# Patient Record
Sex: MALE | Race: BLACK OR AFRICAN AMERICAN | Employment: UNEMPLOYED | URBAN - METROPOLITAN AREA
[De-identification: names, ages, dates, MRNs, and addresses within clinical notes are randomized per-mention and may not be internally consistent; named-entity substitution may affect disease eponyms.]

---

## 2020-01-15 ENCOUNTER — ED HISTORICAL/CONVERTED ENCOUNTER (OUTPATIENT)
Dept: OTHER | Age: 25
End: 2020-01-15

## 2020-02-04 ENCOUNTER — ED HISTORICAL/CONVERTED ENCOUNTER (OUTPATIENT)
Dept: OTHER | Age: 25
End: 2020-02-04

## 2020-03-08 ENCOUNTER — ED HISTORICAL/CONVERTED ENCOUNTER (OUTPATIENT)
Dept: OTHER | Age: 25
End: 2020-03-08

## 2021-03-16 ENCOUNTER — HOSPITAL ENCOUNTER (EMERGENCY)
Age: 26
Discharge: HOME | End: 2021-03-16
Attending: EMERGENCY MEDICINE
Payer: MEDICAID

## 2021-03-16 VITALS
HEART RATE: 57 BPM | DIASTOLIC BLOOD PRESSURE: 73 MMHG | SYSTOLIC BLOOD PRESSURE: 121 MMHG | RESPIRATION RATE: 18 BRPM | HEIGHT: 74 IN | BODY MASS INDEX: 22.46 KG/M2 | OXYGEN SATURATION: 98 % | WEIGHT: 175 LBS | TEMPERATURE: 98.6 F

## 2021-03-16 DIAGNOSIS — N34.2 URETHRITIS: Primary | ICD-10-CM

## 2021-03-16 LAB
APPEARANCE UR: CLEAR
BACTERIA URNS QL MICRO: NEGATIVE /HPF
BILIRUB UR QL: NEGATIVE
COLOR UR: YELLOW
EPITH CASTS URNS QL MICRO: NORMAL /LPF
GLUCOSE UR STRIP.AUTO-MCNC: NEGATIVE MG/DL
HGB UR QL STRIP: ABNORMAL
KETONES UR QL STRIP.AUTO: NEGATIVE MG/DL
LEUKOCYTE ESTERASE UR QL STRIP.AUTO: NEGATIVE
NITRITE UR QL STRIP.AUTO: NEGATIVE
PH UR STRIP: 5 [PH] (ref 5–8)
PROT UR STRIP-MCNC: NEGATIVE MG/DL
RBC #/AREA URNS HPF: NORMAL /HPF (ref 0–5)
SP GR UR REFRACTOMETRY: 1.02 (ref 1–1.03)
UROBILINOGEN UR QL STRIP.AUTO: 0.1 EU/DL (ref 0.2–1)
WBC URNS QL MICRO: NORMAL /HPF (ref 0–4)

## 2021-03-16 PROCEDURE — 74011250637 HC RX REV CODE- 250/637: Performed by: EMERGENCY MEDICINE

## 2021-03-16 PROCEDURE — 81001 URINALYSIS AUTO W/SCOPE: CPT

## 2021-03-16 PROCEDURE — 87491 CHLMYD TRACH DNA AMP PROBE: CPT

## 2021-03-16 PROCEDURE — 99283 EMERGENCY DEPT VISIT LOW MDM: CPT

## 2021-03-16 RX ORDER — DOXYCYCLINE 100 MG/1
100 CAPSULE ORAL 2 TIMES DAILY
Qty: 14 CAP | Refills: 0 | Status: SHIPPED | OUTPATIENT
Start: 2021-03-16

## 2021-03-16 RX ORDER — METRONIDAZOLE 500 MG/1
2000 TABLET ORAL
Status: COMPLETED | OUTPATIENT
Start: 2021-03-16 | End: 2021-03-16

## 2021-03-16 RX ORDER — DOXYCYCLINE 100 MG/1
100 CAPSULE ORAL ONCE
Status: COMPLETED | OUTPATIENT
Start: 2021-03-16 | End: 2021-03-16

## 2021-03-16 RX ADMIN — METRONIDAZOLE 2000 MG: 500 TABLET ORAL at 19:42

## 2021-03-16 RX ADMIN — DOXYCYCLINE HYCLATE 100 MG: 100 CAPSULE ORAL at 19:42
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
## 2021-03-17 LAB
C TRACH DNA SPEC QL NAA+PROBE: NEGATIVE
N GONORRHOEA DNA SPEC QL NAA+PROBE: NEGATIVE
SAMPLE TYPE: NORMAL
SERVICE CMNT-IMP: NORMAL
SPECIMEN SOURCE: NORMAL

## 2023-07-20 ENCOUNTER — APPOINTMENT (OUTPATIENT)
Facility: HOSPITAL | Age: 28
End: 2023-07-20
Payer: MEDICAID

## 2023-07-20 ENCOUNTER — HOSPITAL ENCOUNTER (EMERGENCY)
Facility: HOSPITAL | Age: 28
Discharge: HOME | End: 2023-07-20
Attending: STUDENT IN AN ORGANIZED HEALTH CARE EDUCATION/TRAINING PROGRAM
Payer: MEDICAID

## 2023-07-20 VITALS
BODY MASS INDEX: 23.19 KG/M2 | HEART RATE: 104 BPM | HEIGHT: 73 IN | OXYGEN SATURATION: 95 % | WEIGHT: 175 LBS | RESPIRATION RATE: 18 BRPM | DIASTOLIC BLOOD PRESSURE: 76 MMHG | SYSTOLIC BLOOD PRESSURE: 118 MMHG | TEMPERATURE: 98.5 F

## 2023-07-20 DIAGNOSIS — S20.219A CONTUSION OF CHEST WALL, UNSPECIFIED LATERALITY, INITIAL ENCOUNTER: Primary | ICD-10-CM

## 2023-07-20 DIAGNOSIS — T14.8XXA ABRASION: ICD-10-CM

## 2023-07-20 LAB
ALBUMIN SERPL-MCNC: 4 G/DL (ref 3.5–5)
ALBUMIN/GLOB SERPL: 1.1 (ref 1.1–2.2)
ALP SERPL-CCNC: 106 U/L (ref 45–117)
ALT SERPL-CCNC: 17 U/L (ref 12–78)
ANION GAP SERPL CALC-SCNC: 9 MMOL/L (ref 5–15)
AST SERPL W P-5'-P-CCNC: 20 U/L (ref 15–37)
BASOPHILS # BLD: 0 K/UL (ref 0–0.1)
BASOPHILS NFR BLD: 0 % (ref 0–1)
BILIRUB SERPL-MCNC: 1.1 MG/DL (ref 0.2–1)
BUN SERPL-MCNC: 9 MG/DL (ref 6–20)
BUN/CREAT SERPL: 6 (ref 12–20)
CA-I BLD-MCNC: 9.7 MG/DL (ref 8.5–10.1)
CHLORIDE SERPL-SCNC: 103 MMOL/L (ref 97–108)
CO2 SERPL-SCNC: 24 MMOL/L (ref 21–32)
CREAT SERPL-MCNC: 1.5 MG/DL (ref 0.7–1.3)
DIFFERENTIAL METHOD BLD: ABNORMAL
EOSINOPHIL # BLD: 0 K/UL (ref 0–0.4)
EOSINOPHIL NFR BLD: 0 % (ref 0–7)
ERYTHROCYTE [DISTWIDTH] IN BLOOD BY AUTOMATED COUNT: 15 % (ref 11.5–14.5)
GLOBULIN SER CALC-MCNC: 3.5 G/DL (ref 2–4)
GLUCOSE SERPL-MCNC: 112 MG/DL (ref 65–100)
HCT VFR BLD AUTO: 51.5 % (ref 36.6–50.3)
HGB BLD-MCNC: 17.1 G/DL (ref 12.1–17)
IMM GRANULOCYTES # BLD AUTO: 0.1 K/UL (ref 0–0.04)
IMM GRANULOCYTES NFR BLD AUTO: 1 % (ref 0–0.5)
LYMPHOCYTES # BLD: 1.7 K/UL (ref 0.8–3.5)
LYMPHOCYTES NFR BLD: 24 % (ref 12–49)
MCH RBC QN AUTO: 32.2 PG (ref 26–34)
MCHC RBC AUTO-ENTMCNC: 33.2 G/DL (ref 30–36.5)
MCV RBC AUTO: 97 FL (ref 80–99)
MONOCYTES # BLD: 0.9 K/UL (ref 0–1)
MONOCYTES NFR BLD: 12 % (ref 5–13)
NEUTS SEG # BLD: 4.6 K/UL (ref 1.8–8)
NEUTS SEG NFR BLD: 63 % (ref 32–75)
NRBC # BLD: 0 K/UL (ref 0–0.01)
NRBC BLD-RTO: 0 PER 100 WBC
PLATELET # BLD AUTO: 221 K/UL (ref 150–400)
PMV BLD AUTO: 8.6 FL (ref 8.9–12.9)
POTASSIUM SERPL-SCNC: 3.6 MMOL/L (ref 3.5–5.1)
PROT SERPL-MCNC: 7.5 G/DL (ref 6.4–8.2)
RBC # BLD AUTO: 5.31 M/UL (ref 4.1–5.7)
SODIUM SERPL-SCNC: 136 MMOL/L (ref 136–145)
TROPONIN I SERPL HS-MCNC: 12 NG/L (ref 0–76)
WBC # BLD AUTO: 7.3 K/UL (ref 4.1–11.1)

## 2023-07-20 PROCEDURE — 85025 COMPLETE CBC W/AUTO DIFF WBC: CPT

## 2023-07-20 PROCEDURE — 99285 EMERGENCY DEPT VISIT HI MDM: CPT

## 2023-07-20 PROCEDURE — 90471 IMMUNIZATION ADMIN: CPT | Performed by: STUDENT IN AN ORGANIZED HEALTH CARE EDUCATION/TRAINING PROGRAM

## 2023-07-20 PROCEDURE — 6370000000 HC RX 637 (ALT 250 FOR IP): Performed by: STUDENT IN AN ORGANIZED HEALTH CARE EDUCATION/TRAINING PROGRAM

## 2023-07-20 PROCEDURE — 6360000002 HC RX W HCPCS: Performed by: STUDENT IN AN ORGANIZED HEALTH CARE EDUCATION/TRAINING PROGRAM

## 2023-07-20 PROCEDURE — 71045 X-RAY EXAM CHEST 1 VIEW: CPT

## 2023-07-20 PROCEDURE — 90714 TD VACC NO PRESV 7 YRS+ IM: CPT | Performed by: STUDENT IN AN ORGANIZED HEALTH CARE EDUCATION/TRAINING PROGRAM

## 2023-07-20 PROCEDURE — 80053 COMPREHEN METABOLIC PANEL: CPT

## 2023-07-20 PROCEDURE — 93005 ELECTROCARDIOGRAM TRACING: CPT | Performed by: STUDENT IN AN ORGANIZED HEALTH CARE EDUCATION/TRAINING PROGRAM

## 2023-07-20 PROCEDURE — 84484 ASSAY OF TROPONIN QUANT: CPT

## 2023-07-20 PROCEDURE — 36415 COLL VENOUS BLD VENIPUNCTURE: CPT

## 2023-07-20 RX ORDER — ACETAMINOPHEN 500 MG
1000 TABLET ORAL
Status: COMPLETED | OUTPATIENT
Start: 2023-07-20 | End: 2023-07-20

## 2023-07-20 RX ADMIN — ACETAMINOPHEN 1000 MG: 500 TABLET ORAL at 18:58

## 2023-07-20 RX ADMIN — CLOSTRIDIUM TETANI TOXOID ANTIGEN (FORMALDEHYDE INACTIVATED) AND CORYNEBACTERIUM DIPHTHERIAE TOXOID ANTIGEN (FORMALDEHYDE INACTIVATED) 0.5 ML: 5; 2 INJECTION, SUSPENSION INTRAMUSCULAR at 19:12

## 2023-07-20 ASSESSMENT — PAIN SCALES - GENERAL
PAINLEVEL_OUTOF10: 6
PAINLEVEL_OUTOF10: 8

## 2023-07-20 ASSESSMENT — LIFESTYLE VARIABLES
HOW MANY STANDARD DRINKS CONTAINING ALCOHOL DO YOU HAVE ON A TYPICAL DAY: PATIENT DOES NOT DRINK
HOW OFTEN DO YOU HAVE A DRINK CONTAINING ALCOHOL: NEVER

## 2023-07-20 ASSESSMENT — PAIN - FUNCTIONAL ASSESSMENT: PAIN_FUNCTIONAL_ASSESSMENT: 0-10
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
## 2023-07-21 LAB
EKG ATRIAL RATE: 99 BPM
EKG DIAGNOSIS: NORMAL
EKG P AXIS: 79 DEGREES
EKG P-R INTERVAL: 146 MS
EKG Q-T INTERVAL: 352 MS
EKG QRS DURATION: 92 MS
EKG QTC CALCULATION (BAZETT): 451 MS
EKG R AXIS: 75 DEGREES
EKG T AXIS: 63 DEGREES
EKG VENTRICULAR RATE: 99 BPM

## 2023-08-09 ENCOUNTER — HOSPITAL ENCOUNTER (EMERGENCY)
Facility: HOSPITAL | Age: 28
Discharge: HOME | End: 2023-08-09
Payer: MEDICAID

## 2023-08-09 ENCOUNTER — APPOINTMENT (OUTPATIENT)
Facility: HOSPITAL | Age: 28
End: 2023-08-09
Payer: MEDICAID

## 2023-08-09 VITALS
WEIGHT: 175 LBS | HEART RATE: 66 BPM | OXYGEN SATURATION: 100 % | BODY MASS INDEX: 23.19 KG/M2 | RESPIRATION RATE: 18 BRPM | HEIGHT: 73 IN | TEMPERATURE: 98.6 F | SYSTOLIC BLOOD PRESSURE: 141 MMHG | DIASTOLIC BLOOD PRESSURE: 91 MMHG

## 2023-08-09 DIAGNOSIS — S62.602D CLOSED NONDISPLACED FRACTURE OF PHALANX OF RIGHT MIDDLE FINGER WITH ROUTINE HEALING, UNSPECIFIED PHALANX, SUBSEQUENT ENCOUNTER: Primary | ICD-10-CM

## 2023-08-09 PROCEDURE — 99283 EMERGENCY DEPT VISIT LOW MDM: CPT

## 2023-08-09 PROCEDURE — 73130 X-RAY EXAM OF HAND: CPT

## 2023-08-09 PROCEDURE — 6370000000 HC RX 637 (ALT 250 FOR IP)

## 2023-08-09 RX ORDER — IBUPROFEN 600 MG/1
600 TABLET ORAL EVERY 6 HOURS PRN
Status: DISCONTINUED | OUTPATIENT
Start: 2023-08-09 | End: 2023-08-09 | Stop reason: HOSPADM

## 2023-08-09 RX ADMIN — IBUPROFEN 600 MG: 600 TABLET, FILM COATED ORAL at 12:09

## 2023-08-09 ASSESSMENT — PAIN - FUNCTIONAL ASSESSMENT: PAIN_FUNCTIONAL_ASSESSMENT: 0-10

## 2023-08-09 ASSESSMENT — PAIN SCALES - GENERAL: PAINLEVEL_OUTOF10: 7
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
## 2023-08-09 NOTE — DISCHARGE INSTRUCTIONS
Thank you! Thank you for allowing me to care for you in the emergency department. It is my goal to provide you with excellent care. If you have not received excellent quality care, please ask to speak to the nurse manager. Please fill out the survey that will come to you by mail or email since we listen to your feedback! Below you will find a list of your tests from today's visit. Should you have any questions, please do not hesitate to call the emergency department. Labs  No results found for this or any previous visit (from the past 12 hour(s)). Radiologic Studies  XR HAND RIGHT (MIN 3 VIEWS)   Final Result   Acute nondisplaced right third finger middle phalangeal fracture. Mohini Torre ------------------------------------------------------------------------------------------------------------  The exam and treatment you received in the Emergency Department were for an urgent problem and are not intended as complete care. It is important that you follow-up with a doctor, nurse practitioner, or physician assistant to:  (1) confirm your diagnosis,  (2) re-evaluation of changes in your illness and treatment, and  (3) for ongoing care. Please take your discharge instructions with you when you go to your follow-up appointment. If you have any problem arranging a follow-up appointment, contact the Emergency Department. If your symptoms become worse or you do not improve as expected and you are unable to reach your health care provider, please return to the Emergency Department. We are available 24 hours a day. If a prescription has been provided, please have it filled as soon as possible to prevent a delay in treatment. If you have any questions or reservations about taking the medication due to side effects or interactions with other medications, please call your primary care provider or contact the ER.

## 2024-03-25 ENCOUNTER — APPOINTMENT (OUTPATIENT)
Facility: HOSPITAL | Age: 29
End: 2024-03-25
Payer: MEDICAID

## 2024-03-25 ENCOUNTER — HOSPITAL ENCOUNTER (EMERGENCY)
Facility: HOSPITAL | Age: 29
Discharge: HOME | End: 2024-03-25
Payer: MEDICAID

## 2024-03-25 VITALS
BODY MASS INDEX: 22.53 KG/M2 | SYSTOLIC BLOOD PRESSURE: 147 MMHG | RESPIRATION RATE: 14 BRPM | WEIGHT: 170 LBS | TEMPERATURE: 98.1 F | DIASTOLIC BLOOD PRESSURE: 101 MMHG | HEART RATE: 64 BPM | OXYGEN SATURATION: 100 % | HEIGHT: 73 IN

## 2024-03-25 DIAGNOSIS — S69.91XA HAND INJURY, RIGHT, INITIAL ENCOUNTER: Primary | ICD-10-CM

## 2024-03-25 PROCEDURE — 96372 THER/PROPH/DIAG INJ SC/IM: CPT

## 2024-03-25 PROCEDURE — 99284 EMERGENCY DEPT VISIT MOD MDM: CPT

## 2024-03-25 PROCEDURE — 6360000002 HC RX W HCPCS

## 2024-03-25 PROCEDURE — 73130 X-RAY EXAM OF HAND: CPT

## 2024-03-25 RX ORDER — KETOROLAC TROMETHAMINE 10 MG/1
10 TABLET, FILM COATED ORAL EVERY 6 HOURS PRN
Qty: 20 TABLET | Refills: 0 | Status: SHIPPED | OUTPATIENT
Start: 2024-03-25 | End: 2024-03-30

## 2024-03-25 RX ORDER — KETOROLAC TROMETHAMINE 15 MG/ML
15 INJECTION, SOLUTION INTRAMUSCULAR; INTRAVENOUS
Status: COMPLETED | OUTPATIENT
Start: 2024-03-25 | End: 2024-03-25

## 2024-03-25 RX ADMIN — KETOROLAC TROMETHAMINE 15 MG: 15 INJECTION, SOLUTION INTRAMUSCULAR; INTRAVENOUS at 15:15

## 2024-03-25 ASSESSMENT — PAIN SCALES - GENERAL
PAINLEVEL_OUTOF10: 10

## 2024-03-25 ASSESSMENT — PAIN DESCRIPTION - LOCATION: LOCATION: WRIST

## 2024-03-25 ASSESSMENT — PAIN - FUNCTIONAL ASSESSMENT
PAIN_FUNCTIONAL_ASSESSMENT: 0-10
PAIN_FUNCTIONAL_ASSESSMENT: 0-10

## 2024-03-25 ASSESSMENT — PAIN DESCRIPTION - ORIENTATION: ORIENTATION: RIGHT

## 2024-03-25 NOTE — DISCHARGE INSTRUCTIONS
Thank you!  Thank you for allowing me to care for you in the emergency department. It is my goal to provide you with excellent care.  Please fill out the survey that will come to you by mail or email since we listen to your feedback!     Below you will find a list of your tests from today's visit.  Should you have any questions, please do not hesitate to call the emergency department.    Labs  No results found for this or any previous visit (from the past 12 hour(s)).    Radiologic Studies  XR HAND RIGHT (MIN 3 VIEWS)   Final Result   Healed right third middle phalanx fracture.        ------------------------------------------------------------------------------------------------------------  The exam and treatment you received in the Emergency Department were for an urgent problem and are not intended as complete care. It is important that you follow-up with a doctor, nurse practitioner, or physician assistant to:  (1) confirm your diagnosis,  (2) re-evaluation of changes in your illness and treatment, and (3) for ongoing care. Please take your discharge instructions with you when you go to your follow-up appointment.     If you have any problem arranging a follow-up appointment, contact the Emergency Department.  If your symptoms become worse or you do not improve as expected and you are unable to reach your health care provider, please return to the Emergency Department. We are available 24 hours a day.     If a prescription has been provided, please have it filled as soon as possible to prevent a delay in treatment. If you have any questions or reservations about taking the medication due to side effects or interactions with other medications, please call your primary care provider or contact the ER.

## 2024-03-25 NOTE — ED PROVIDER NOTES
[facility] EMERGENCY DEPT  EMERGENCY DEPARTMENT HISTORY AND PHYSICAL EXAM      Date: 3/25/2024  Patient Name: Alejandra Soriano  MRN: [id number]  YOB: 1995  Date of evaluation: 3/25/2024  Provider: Fiona Milan PA-C   Note Started: 2:39 PM EDT 3/25/24    HISTORY OF PRESENT ILLNESS     Chief Complaint   Patient presents with    Hand Pain       History Provided By: Patient    HPI: Alejandra Soriano is a 28 y.o. male with no pertinent PMH who presents ambulatory to the ED for 10/10 right hand pain that he noticed this morning.  He is unsure of trauma or injury as patient states that he took Xanax last night and \"blacked out.\" He denies numbness, tingling, or weakness. He states movement exacerbates his symptoms and rest relieves his symptoms. He has not taken any medications for his symptoms.  Patient reports that he previously fractured his right middle finger in August 2023 but denies history of other injuries or trauma to the hand.  Denies fever, chills, cough, congestion, dizziness, headaches,  symptoms, change in eye symptoms, SOB, or CP.    PAST MEDICAL HISTORY   Past Medical History:  No past medical history on file.    Past Surgical History:  No past surgical history on file.    Family History:  No family history on file.    Social History:  Social History     Tobacco Use    Smoking status: Every Day     Current packs/day: 0.50     Types: Cigarettes    Smokeless tobacco: Never   Substance Use Topics    Alcohol use: Yes     Comment: occasionally    Drug use: Yes     Frequency: 7.0 times per week     Types: Marijuana (Weed)       Allergies:  No Known Allergies    PCP: No primary care provider on file.    Current Meds:   No current facility-administered medications for this encounter.     Current Outpatient Medications   Medication Sig Dispense Refill    ketorolac (TORADOL) 10 MG tablet Take 1 tablet by mouth every 6 hours as needed for Pain 20 tablet 0       Social Determinants of Health:   Social

## 2024-06-09 ENCOUNTER — HOSPITAL ENCOUNTER (INPATIENT)
Facility: HOSPITAL | Age: 29
LOS: 5 days | Discharge: HOME | DRG: 751 | End: 2024-06-14
Attending: PSYCHIATRY & NEUROLOGY | Admitting: PSYCHIATRY & NEUROLOGY
Payer: MEDICAID

## 2024-06-09 DIAGNOSIS — R45.851 SUICIDAL IDEATIONS: Primary | ICD-10-CM

## 2024-06-09 PROBLEM — F32.2 MAJOR DEPRESSIVE DISORDER, SEVERE (HCC): Status: ACTIVE | Noted: 2024-06-09

## 2024-06-09 PROBLEM — F32.A DEPRESSION: Status: ACTIVE | Noted: 2024-06-09

## 2024-06-09 LAB
AMPHET UR QL SCN: POSITIVE
ANION GAP SERPL CALC-SCNC: 3 MMOL/L (ref 5–15)
APPEARANCE UR: CLEAR
BACTERIA URNS QL MICRO: NEGATIVE /HPF
BARBITURATES UR QL SCN: NEGATIVE
BASOPHILS # BLD: 0 K/UL (ref 0–0.1)
BASOPHILS NFR BLD: 0 % (ref 0–1)
BENZODIAZ UR QL: NEGATIVE
BILIRUB UR QL: NEGATIVE
BUN SERPL-MCNC: 3 MG/DL (ref 6–20)
BUN/CREAT SERPL: 3 (ref 12–20)
CA-I BLD-MCNC: 9.5 MG/DL (ref 8.5–10.1)
CANNABINOIDS UR QL SCN: POSITIVE
CHLORIDE SERPL-SCNC: 107 MMOL/L (ref 97–108)
CO2 SERPL-SCNC: 26 MMOL/L (ref 21–32)
COCAINE UR QL SCN: POSITIVE
COLOR UR: NORMAL
CREAT SERPL-MCNC: 1.04 MG/DL (ref 0.7–1.3)
DIFFERENTIAL METHOD BLD: ABNORMAL
EOSINOPHIL # BLD: 0 K/UL (ref 0–0.4)
EOSINOPHIL NFR BLD: 0 % (ref 0–7)
EPITH CASTS URNS QL MICRO: NORMAL /LPF
ERYTHROCYTE [DISTWIDTH] IN BLOOD BY AUTOMATED COUNT: 14.9 % (ref 11.5–14.5)
ETHANOL SERPL-MCNC: 24 MG/DL (ref 0–0.08)
FLUAV RNA SPEC QL NAA+PROBE: NOT DETECTED
FLUBV RNA SPEC QL NAA+PROBE: NOT DETECTED
GLUCOSE SERPL-MCNC: 79 MG/DL (ref 65–100)
GLUCOSE UR STRIP.AUTO-MCNC: NEGATIVE MG/DL
HCT VFR BLD AUTO: 49.5 % (ref 36.6–50.3)
HGB BLD-MCNC: 16.3 G/DL (ref 12.1–17)
HGB UR QL STRIP: NEGATIVE
IMM GRANULOCYTES # BLD AUTO: 0 K/UL (ref 0–0.04)
IMM GRANULOCYTES NFR BLD AUTO: 0 % (ref 0–0.5)
KETONES UR QL STRIP.AUTO: NEGATIVE MG/DL
LEUKOCYTE ESTERASE UR QL STRIP.AUTO: NEGATIVE
LYMPHOCYTES # BLD: 3 K/UL (ref 0.8–3.5)
LYMPHOCYTES NFR BLD: 30 % (ref 12–49)
Lab: ABNORMAL
MCH RBC QN AUTO: 32.7 PG (ref 26–34)
MCHC RBC AUTO-ENTMCNC: 32.9 G/DL (ref 30–36.5)
MCV RBC AUTO: 99.2 FL (ref 80–99)
METHADONE UR QL: NEGATIVE
MONOCYTES # BLD: 1 K/UL (ref 0–1)
MONOCYTES NFR BLD: 10 % (ref 5–13)
MUCOUS THREADS URNS QL MICRO: NORMAL /LPF
NEUTS SEG # BLD: 5.9 K/UL (ref 1.8–8)
NEUTS SEG NFR BLD: 60 % (ref 32–75)
NITRITE UR QL STRIP.AUTO: NEGATIVE
NRBC # BLD: 0 K/UL (ref 0–0.01)
NRBC BLD-RTO: 0 PER 100 WBC
OPIATES UR QL: NEGATIVE
PCP UR QL: NEGATIVE
PH UR STRIP: 6 (ref 5–8)
PLATELET # BLD AUTO: 245 K/UL (ref 150–400)
PMV BLD AUTO: 8.8 FL (ref 8.9–12.9)
POTASSIUM SERPL-SCNC: 3.8 MMOL/L (ref 3.5–5.1)
PROT UR STRIP-MCNC: NEGATIVE MG/DL
RBC # BLD AUTO: 4.99 M/UL (ref 4.1–5.7)
RBC #/AREA URNS HPF: NORMAL /HPF (ref 0–5)
SARS-COV-2 RNA RESP QL NAA+PROBE: NOT DETECTED
SODIUM SERPL-SCNC: 136 MMOL/L (ref 136–145)
SP GR UR REFRACTOMETRY: 1.02 (ref 1–1.03)
UROBILINOGEN UR QL STRIP.AUTO: 0.1 EU/DL (ref 0.1–1)
WBC # BLD AUTO: 10 K/UL (ref 4.1–11.1)
WBC URNS QL MICRO: NORMAL /HPF (ref 0–4)

## 2024-06-09 PROCEDURE — 85025 COMPLETE CBC W/AUTO DIFF WBC: CPT

## 2024-06-09 PROCEDURE — 6370000000 HC RX 637 (ALT 250 FOR IP): Performed by: PSYCHIATRY & NEUROLOGY

## 2024-06-09 PROCEDURE — 82077 ASSAY SPEC XCP UR&BREATH IA: CPT

## 2024-06-09 PROCEDURE — 36415 COLL VENOUS BLD VENIPUNCTURE: CPT

## 2024-06-09 PROCEDURE — 87636 SARSCOV2 & INF A&B AMP PRB: CPT

## 2024-06-09 PROCEDURE — 80307 DRUG TEST PRSMV CHEM ANLYZR: CPT

## 2024-06-09 PROCEDURE — 81003 URINALYSIS AUTO W/O SCOPE: CPT

## 2024-06-09 PROCEDURE — 1240000000 HC EMOTIONAL WELLNESS R&B

## 2024-06-09 PROCEDURE — 80048 BASIC METABOLIC PNL TOTAL CA: CPT

## 2024-06-09 PROCEDURE — 90791 PSYCH DIAGNOSTIC EVALUATION: CPT

## 2024-06-09 PROCEDURE — 99285 EMERGENCY DEPT VISIT HI MDM: CPT

## 2024-06-09 RX ORDER — HYDROXYZINE HYDROCHLORIDE 25 MG/1
25 TABLET, FILM COATED ORAL 3 TIMES DAILY PRN
Status: DISCONTINUED | OUTPATIENT
Start: 2024-06-09 | End: 2024-06-14 | Stop reason: HOSPADM

## 2024-06-09 RX ORDER — CHLORDIAZEPOXIDE HYDROCHLORIDE 25 MG/1
25 CAPSULE, GELATIN COATED ORAL 2 TIMES DAILY
Status: DISCONTINUED | OUTPATIENT
Start: 2024-06-09 | End: 2024-06-11

## 2024-06-09 RX ORDER — TRAZODONE HYDROCHLORIDE 50 MG/1
50 TABLET ORAL NIGHTLY PRN
Status: DISCONTINUED | OUTPATIENT
Start: 2024-06-09 | End: 2024-06-14 | Stop reason: HOSPADM

## 2024-06-09 RX ORDER — NICOTINE 21 MG/24HR
1 PATCH, TRANSDERMAL 24 HOURS TRANSDERMAL DAILY
Status: DISCONTINUED | OUTPATIENT
Start: 2024-06-09 | End: 2024-06-14 | Stop reason: HOSPADM

## 2024-06-09 RX ORDER — ACETAMINOPHEN 325 MG/1
650 TABLET ORAL EVERY 4 HOURS PRN
Status: DISCONTINUED | OUTPATIENT
Start: 2024-06-09 | End: 2024-06-14 | Stop reason: HOSPADM

## 2024-06-09 RX ORDER — MAGNESIUM HYDROXIDE/ALUMINUM HYDROXICE/SIMETHICONE 120; 1200; 1200 MG/30ML; MG/30ML; MG/30ML
30 SUSPENSION ORAL EVERY 6 HOURS PRN
Status: DISCONTINUED | OUTPATIENT
Start: 2024-06-09 | End: 2024-06-14 | Stop reason: HOSPADM

## 2024-06-09 RX ADMIN — CHLORDIAZEPOXIDE HYDROCHLORIDE 25 MG: 25 CAPSULE ORAL at 21:00

## 2024-06-09 RX ADMIN — TRAZODONE HYDROCHLORIDE 50 MG: 50 TABLET ORAL at 21:37

## 2024-06-09 ASSESSMENT — PAIN SCALES - GENERAL: PAINLEVEL_OUTOF10: 0

## 2024-06-09 ASSESSMENT — SLEEP AND FATIGUE QUESTIONNAIRES
DO YOU HAVE DIFFICULTY SLEEPING: NO
DO YOU USE A SLEEP AID: NO
AVERAGE NUMBER OF SLEEP HOURS: 6

## 2024-06-09 ASSESSMENT — LIFESTYLE VARIABLES
HOW MANY STANDARD DRINKS CONTAINING ALCOHOL DO YOU HAVE ON A TYPICAL DAY: 5 OR 6
HOW OFTEN DO YOU HAVE A DRINK CONTAINING ALCOHOL: 4 OR MORE TIMES A WEEK

## 2024-06-09 NOTE — BSMART NOTE
BSMART assessment completed, and suicide risk level noted to be HIGH. Primary Nurse, Jose David, notified. Concerns not observed. Plan is to obtain medical clearance and present for admission.          
decreased and shows signs of weight loss .  The patient's sleep has evidence of insomnia. The patient's insight shows no evidence of impairment.  The patient's judgement shows no evidence of impairment.      Section V - Substance Abuse  The patient is  using substances.  He reported that he uses marijuana and drinks alcohol daily. Last use of marijuana was before he came to the ED. Last use of alcohol was last night or early this morning. He also reported that he uses cocaine weekly. Last use was last night.     Section VI - Living Arrangements  The patient is in a relationship with a girlfriend of 10-11 months.  The patient lives with a significant other. The patient has  one child .  The patient does plan to return home upon discharge.  The patient does not have legal issues pending. The patient's source of income comes from family.  Mandaen and cultural practices have not been voiced at this time.    The patient's greatest support comes from his girlfriend, cousins, brother, aunt and grandpa.    The patient has not been in an event described as horrible or outside the realm of ordinary life experience either currently or in the past.  The patient has not been a victim of sexual/physical abuse.    Section VII - Other Areas of Clinical Concern  The highest grade achieved is 11th grade.   The patient is currently unemployed and speaks English as a primary language.  The patient has no communication impairments affecting communication. The patient's preference for learning can be described as: can read and write adequately.  The patient's hearing is normal.  The patient's vision is normal.      Cecelia Williamson LPC, CSOTP  Licensed Professional Counselor   Certified Sex Offender Treatment Provider

## 2024-06-09 NOTE — ED PROVIDER NOTES
week     Average Number of Drinks: 5 or 6     Frequency of Binge Drinking: Daily or almost daily   Financial Resource Strain: Not on file   Food Insecurity: Not on file   Transportation Needs: Not on file   Physical Activity: Not on file   Stress: Not on file   Social Connections: Not on file   Intimate Partner Violence: Not on file   Depression: Not on file   Housing Stability: Not on file   Interpersonal Safety: Not At Risk (6/9/2024)    Interpersonal Safety Domain Source: IP Abuse Screening     Physical abuse: Denies     Verbal abuse: Denies     Emotional abuse: Denies     Financial abuse: Denies     Sexual abuse: Denies   Utilities: Not on file       PHYSICAL EXAM   Physical Exam  Vitals and nursing note reviewed.   Constitutional:       General: He is not in acute distress.     Appearance: Normal appearance. He is not ill-appearing.   HENT:      Head: Normocephalic and atraumatic.   Cardiovascular:      Rate and Rhythm: Normal rate and regular rhythm.      Pulses: Normal pulses.      Heart sounds: Normal heart sounds.   Pulmonary:      Effort: Pulmonary effort is normal. No respiratory distress.      Breath sounds: Normal breath sounds. No wheezing.   Abdominal:      General: Abdomen is flat. Bowel sounds are normal.      Palpations: Abdomen is soft.      Tenderness: There is no abdominal tenderness.   Neurological:      Mental Status: He is alert.   Psychiatric:         Mood and Affect: Mood normal.      Comments: Suicidal and tearful       SCREENINGS                  LAB, EKG AND DIAGNOSTIC RESULTS   Labs:  Recent Results (from the past 12 hour(s))   CBC with Auto Differential    Collection Time: 06/09/24 12:32 PM   Result Value Ref Range    WBC 10.0 4.1 - 11.1 K/uL    RBC 4.99 4.10 - 5.70 M/uL    Hemoglobin 16.3 12.1 - 17.0 g/dL    Hematocrit 49.5 36.6 - 50.3 %    MCV 99.2 (H) 80.0 - 99.0 FL    MCH 32.7 26.0 - 34.0 PG    MCHC 32.9 30.0 - 36.5 g/dL    RDW 14.9 (H) 11.5 - 14.5 %    Platelets 245 150 - 400 K/uL

## 2024-06-09 NOTE — GROUP NOTE
Group Therapy Note    Date: 6/9/2024    Group Start Time: 1535  Group End Time: 1620  Group Topic: Recreational    SSR 2 [facility] NON ACUTE    Olamide Garrison        Group Therapy Note    Facilitated leisure skills group to reinforce positive coping and to manage mood through music, social interaction, group activities and art task       Attendees: 5/10       Notes:  Did not attend. Pt was in the process of being admitted    Discipline Responsible: Recreational Therapist      Signature:  SULEMA Gregg

## 2024-06-10 LAB
EKG ATRIAL RATE: 56 BPM
EKG DIAGNOSIS: NORMAL
EKG P AXIS: 4 DEGREES
EKG P-R INTERVAL: 142 MS
EKG Q-T INTERVAL: 410 MS
EKG QRS DURATION: 98 MS
EKG QTC CALCULATION (BAZETT): 395 MS
EKG R AXIS: 66 DEGREES
EKG T AXIS: 54 DEGREES
EKG VENTRICULAR RATE: 56 BPM

## 2024-06-10 PROCEDURE — 6370000000 HC RX 637 (ALT 250 FOR IP): Performed by: INTERNAL MEDICINE

## 2024-06-10 PROCEDURE — 93005 ELECTROCARDIOGRAM TRACING: CPT | Performed by: INTERNAL MEDICINE

## 2024-06-10 PROCEDURE — 6370000000 HC RX 637 (ALT 250 FOR IP): Performed by: PSYCHIATRY & NEUROLOGY

## 2024-06-10 PROCEDURE — 1240000000 HC EMOTIONAL WELLNESS R&B

## 2024-06-10 RX ORDER — ASPIRIN 81 MG/1
81 TABLET, CHEWABLE ORAL DAILY
Status: DISCONTINUED | OUTPATIENT
Start: 2024-06-10 | End: 2024-06-14 | Stop reason: HOSPADM

## 2024-06-10 RX ADMIN — CHLORDIAZEPOXIDE HYDROCHLORIDE 25 MG: 25 CAPSULE ORAL at 21:57

## 2024-06-10 RX ADMIN — HYDROXYZINE HYDROCHLORIDE 25 MG: 25 TABLET, FILM COATED ORAL at 17:00

## 2024-06-10 RX ADMIN — ASPIRIN 81 MG 81 MG: 81 TABLET ORAL at 17:15

## 2024-06-10 RX ADMIN — TRAZODONE HYDROCHLORIDE 50 MG: 50 TABLET ORAL at 21:57

## 2024-06-10 RX ADMIN — HYDROXYZINE HYDROCHLORIDE 25 MG: 25 TABLET, FILM COATED ORAL at 03:19

## 2024-06-10 RX ADMIN — CHLORDIAZEPOXIDE HYDROCHLORIDE 25 MG: 25 CAPSULE ORAL at 08:31

## 2024-06-10 NOTE — GROUP NOTE
Group Therapy Note    Date: 6/10/2024    Group Start Time: 1520  Group End Time: 1620  Group Topic: Recreational    SSR 2  NON ACUTE    Olamide Garrison        Group Therapy Note    Facilitated leisure skills group to reinforce positive coping and to manage mood through music, social interaction, group activities and art task       Attendees: 6/9       Patient's Goal:  Client will learn and demonstrate anxiety management skills    Notes:  Pt was receptive to listening to music and songs he selected while working on leisure task. Interacted with peers and staff     Status After Intervention:  Improved    Participation Level: Active Listener and Interactive    Participation Quality: Appropriate and Attentive      Speech:  normal      Thought Process/Content: Logical      Affective Functioning: Congruent      Mood:  Calm      Level of consciousness:  Attentive      Response to Learning: Progressing to goal      Endings: None Reported    Modes of Intervention: Socialization and Activity      Discipline Responsible: Recreational Therapist      Signature:  SULEMA Gregg

## 2024-06-10 NOTE — GROUP NOTE
Group Therapy Note    Date: 6/10/2024    Group Start Time: 1045  Group End Time: 1126  Group Topic: Medication    SSR 2 [facility] ACUTE    Macy Mo RN        Group Therapy Note    Attendees: 6/10       Patient's Goal:  Pt encouraged to attend but declined.    Notes:  Pt encouraged to attend but declined.        Discipline Responsible: Registered Nurse      Signature:  Macy Mo RN

## 2024-06-10 NOTE — GROUP NOTE
Group Therapy Note    Date: 6/10/2024    Group Start Time: 0901  Group End Time: 0935  Group Topic: Community Meeting    [facility]R 2 [facility] AC[facility]    Carly Cardenas        Group Therapy Note    Attendees:        Patient's Goal:      Notes:  PATIENT DID NOT ATTEND GROUP.    Status After Intervention:      Participation Level:     Participation Quality:       Speech:        Thought Process/Content:       Affective Functioning:       Mood:       Level of consciousness:        Response to Learning:       Endings:     Modes of Intervention:       Discipline Responsible:       Signature:  Carly Cardenas

## 2024-06-10 NOTE — GROUP NOTE
Group Therapy Note    Date: 6/10/2024    Group Start Time: 1535  Group End Time: 1820  Group Topic: Education Group - Inpatient    SSR 2  NON ACUTE    Olamide Garrison        Group Therapy Note    Facilitated discussion focus on identifying different barriers that has prevented progress and identifying ways to confront them       Attendees: 5/9       Patient's Goal:  Client will learn and demonstrate anxiety management skills    Notes:  Receptive to information discussed on different barriers and was able to share \"substance use, pretending and stuffing feelings\" as his biggest barriers and shared they prevent him from \"thinking correctly and  being true to himself and others\"  Pt shared he can confront these barriers by \"not using drugs, say what's on his heart and mind and not stuff his feelings\"     Status After Intervention:  Improved    Participation Level: Active Listener and Interactive    Participation Quality: Appropriate, Attentive, and Sharing      Speech:  normal      Thought Process/Content: Logical      Affective Functioning: Congruent      Mood:  Calm      Level of consciousness:  Attentive      Response to Learning: Able to verbalize current knowledge/experience and Progressing to goal      Endings: None Reported    Modes of Intervention: Education and Support      Discipline Responsible: Recreational Therapist      Signature:  Olamide Garrison, CTRS

## 2024-06-10 NOTE — CARE COORDINATION
06/10/24 1545   ITP   Date of Plan 06/10/24   Date of Next Review 06/24/24   Primary Diagnosis Code Major depressive disorder, severe (HCC) Depression   Barriers to Treatment Other (comment);Transportation;Need for psychoeducation  (lack of outpatient resources, no income, poor coping skills)   Strengths Incorporated in Plan Acknowledging need for assistance;Verbal;Family supports;Postive outlook;Seeking interactions   Plan of Care   Long Term Goal (LTG) Stated in patient/guardian terms \"to get my anxiety and to work on myself\"   Short Term Goal 1   Short Term Goal 1 Client will learn and demonstrate improved bourndaries   Baseline Functioning Pt reported that he tends to take care of others and not himself   Target Pt will learn and utilize healthy boundaries with family and friends   Objectives Client will participate in individual therapy;Client will participate in group therapy   Intervention 1 Acknowledge client strengths;Indvidual therapy   Frequency daily   Measured by Self report;Staff observation   Staff Responsible [facility] staff;Clinical staff   Intervention 2 Group therapy   Frequency daily   Measured by Self report;Staff observation   Staff Responsible Clinical staff;[facility] staff   Intervention 3 Monitor medications   Frequency daily   Measured by Self report;Staff observation   Staff Responsible Clinical staff   STG Goal 1 Status: Patient Appears to be  Progressing toward treatment plan goal   Short Term Goal 2   Short Term Goal 2 Client will learn and demonstrate anxiety management skills   Baseline Functioning Pt reported that he would like to decrease his anxiety   Target Pt will learn two new mindfullness techniques   Objectives Client will participate in individual therapy;Client will participate in group therapy   Intervention 1 Acknowledge client strengths;Indvidual therapy   Frequency daily   Measured by Self report;Staff observation   Staff Responsible [facility] staff   Intervention 2 Group therapy

## 2024-06-10 NOTE — GROUP NOTE
Group Therapy Note    Date: 6/10/2024    Group Start Time: 1320  Group End Time: 1400  Group Topic: Process Group - Inpatient    SSR 2 [facility] ACUTE    Radha Zepeda MSW        Group Therapy Note: Facilitator engaged the group in a \"Sentence Completion\" activity to connect and relate to one another. The group applauded each of their peers after sharing their responses.    Attendees: 7       Patient's Goal:  To attend groups    Notes:  Pt shared that his family is everything to his heart. A fond memory is when his daughter was born. He admires his stepfather because he's the true definition of a father. Pt has been struggling with depression and anxiety. He's proud of himself because he still has isela that God will transition his life. He hopes for a good future but today he will be the best he can be. His best friend is his wife. He is afraid of failure. He says the future seems bright and promising as long as he keeps his isela in the lord.    Status After Intervention:  Improved    Participation Level: Active Listener and Interactive    Participation Quality: Appropriate, Attentive, Sharing, and Supportive      Speech:  normal      Thought Process/Content: Logical      Affective Functioning: Congruent      Mood:  \"Faithful and Joyous\"      Level of consciousness:  Alert, Oriented x4, and Attentive      Response to Learning: Able to verbalize current knowledge/experience, Able to verbalize/acknowledge new learning, Able to retain information, Capable of insight, Able to change behavior, and Progressing to goal      Endings: None Reported    Modes of Intervention: Education, Support, and Socialization      Discipline Responsible: /Counselor      Signature:  PO JACOBS

## 2024-06-10 NOTE — CONSULTS
Hospitalist Consultation Note    NAME:  Alejandra Soriano   :   1995   MRN:   [id number]     ATTENDING: Maria D Nichols MD  PCP:  No primary care provider on file.    Date/Time:  6/10/2024 9:27 AM      Recommendations/Plan:       Principal Problem:    Major depressive disorder, severe (HCC)  Active Problems:    Depression  Resolved Problems:    * No resolved hospital problems. *  Anxiety  Bipolar  Amphetamine positive  Cocaine positive  Cannabis positive      Code Status:   DVT Prophylaxis: Ambulatory          Subjective:   REQUESTING PHYSICIAN:  REASON FOR CONSULT:      Alejandra is a 28 y.o.   male who I was asked to see for inpatient psychiatric admission medical evaluation denies any chest pain no shortness of breath no cough no chills            History reviewed. No pertinent past medical history.   History reviewed. No pertinent surgical history.  Social History     Tobacco Use    Smoking status: Every Day     Current packs/day: 1.00     Types: Cigarettes    Smokeless tobacco: Never   Substance Use Topics    Alcohol use: Yes     Comment: \"1 bottle of liquor a day\"      History reviewed. No pertinent family history.    No Known Allergies   Prior to Admission medications    Medication Sig Start Date End Date Taking? Authorizing Provider   ketorolac (TORADOL) 10 MG tablet Take 1 tablet by mouth every 6 hours as needed for Pain 3/25/24 3/30/24  Fiona Milan PA-C       REVIEW OF SYSTEMS:     Total of 12 systems reviewed as follows:   I am not able to complete the review of systems because:   The patient is intubated and sedated    The patient has altered mental status due to his acute medical problems    The patient has baseline aphasia from prior stroke(s)    The patient has baseline dementia and is not reliable historian                 POSITIVE= underlined text  Negative = text not underlined  General:  fever, chills, sweats, generalized weakness, weight loss/gain,      loss of

## 2024-06-11 PROCEDURE — 6370000000 HC RX 637 (ALT 250 FOR IP): Performed by: PSYCHIATRY & NEUROLOGY

## 2024-06-11 PROCEDURE — 1240000000 HC EMOTIONAL WELLNESS R&B

## 2024-06-11 PROCEDURE — 6370000000 HC RX 637 (ALT 250 FOR IP): Performed by: INTERNAL MEDICINE

## 2024-06-11 RX ORDER — CHLORDIAZEPOXIDE HYDROCHLORIDE 10 MG/1
10 CAPSULE, GELATIN COATED ORAL 4 TIMES DAILY PRN
Status: DISCONTINUED | OUTPATIENT
Start: 2024-06-11 | End: 2024-06-14 | Stop reason: HOSPADM

## 2024-06-11 RX ORDER — OXCARBAZEPINE 300 MG/1
300 TABLET, FILM COATED ORAL 2 TIMES DAILY
Status: DISCONTINUED | OUTPATIENT
Start: 2024-06-11 | End: 2024-06-14 | Stop reason: HOSPADM

## 2024-06-11 RX ADMIN — ASPIRIN 81 MG 81 MG: 81 TABLET ORAL at 08:41

## 2024-06-11 RX ADMIN — CHLORDIAZEPOXIDE HYDROCHLORIDE 25 MG: 25 CAPSULE ORAL at 08:41

## 2024-06-11 RX ADMIN — TRAZODONE HYDROCHLORIDE 50 MG: 50 TABLET ORAL at 21:28

## 2024-06-11 RX ADMIN — OXCARBAZEPINE 300 MG: 300 TABLET, FILM COATED ORAL at 20:49

## 2024-06-11 RX ADMIN — OXCARBAZEPINE 300 MG: 300 TABLET, FILM COATED ORAL at 13:13

## 2024-06-11 NOTE — GROUP NOTE
Group Therapy Note    Date: 6/11/2024    Group Start Time: 1515  Group End Time: 1545  Group Topic: Nursing    SSR 2 [facility] ACUTE    Rhina Murillo RN        Group Therapy Note    Attendees: 4/8         Patient's Goal:  Patient encouraged to attend but declined     Notes: did not attend    Status After Intervention:  Unchanged    Participation Level: None      Discipline Responsible: Registered Nurse      Signature:  Rhina Murillo RN

## 2024-06-11 NOTE — GROUP NOTE
Group Therapy Note    Date: 6/11/2024    Group Start Time: 1320  Group End Time: 1400  Group Topic: Process Group - Inpatient    SSR 2 [facility]    Soh Perdomo        Group Therapy Note  Process group was focused on pts strengths and supports. Writer utilized the Scibble activity and asked the pts to draw them as a tree and explain why they picked that tree. Then pts wrote down their strengths, what they need to support them and what they would like to change in their lives to grow. Pts interacted well with one another but writer had to redirect them at times.   Attendees: 6-8       Patient's Goal:  \"to have better boundaries\"    Notes:  Pt interacted well with others and was able to process the activity. Pt shared that he would be a \"coconut tree because he is sweet\". Pt donnell the tree and wrote down all of his strengths at the roots of the tree. Pt shared that he needs more support out side of his family to grow strong.     Status After Intervention:  Improved    Participation Level: Active Listener and Interactive    Participation Quality: Appropriate, Attentive, and Sharing      Speech:  normal      Thought Process/Content: Logical      Affective Functioning: Congruent      Mood: euthymic      Level of consciousness:  Alert, Oriented x4, and Attentive      Response to Learning: Able to verbalize current knowledge/experience, Able to verbalize/acknowledge new learning, Able to retain information, Capable of insight, and Progressing to goal      Endings: None Reported    Modes of Intervention: Exploration and Activity      Discipline Responsible: /Counselor      Signature:  Sho Perdomo

## 2024-06-12 PROCEDURE — 1240000000 HC EMOTIONAL WELLNESS R&B

## 2024-06-12 PROCEDURE — 6370000000 HC RX 637 (ALT 250 FOR IP): Performed by: PSYCHIATRY & NEUROLOGY

## 2024-06-12 PROCEDURE — 6370000000 HC RX 637 (ALT 250 FOR IP): Performed by: INTERNAL MEDICINE

## 2024-06-12 RX ADMIN — OXCARBAZEPINE 300 MG: 300 TABLET, FILM COATED ORAL at 08:18

## 2024-06-12 RX ADMIN — TRAZODONE HYDROCHLORIDE 50 MG: 50 TABLET ORAL at 21:33

## 2024-06-12 RX ADMIN — ASPIRIN 81 MG 81 MG: 81 TABLET ORAL at 08:18

## 2024-06-12 RX ADMIN — OXCARBAZEPINE 300 MG: 300 TABLET, FILM COATED ORAL at 21:32

## 2024-06-12 NOTE — GROUP NOTE
Group Therapy Note    Date: 6/12/2024    Group Start Time: 0900  Group End Time: 0930  Group Topic: Community Meeting    [facility]R 2 [facility] ACUTE    Imelda Mccray        Group Therapy Note    Attendees: 4/10       Patient's Goal:  patient expressed that his goal was discharged and to maintain happiness.     Participation Level: Active Listener and Interactive    Participation Quality: Appropriate    Mood: patient expressed mood as \" determined, confident and relieved.\"     Discipline Responsible: Behavorial Health Tech      Signature:  Imelda Mccray

## 2024-06-12 NOTE — GROUP NOTE
Group Therapy Note    Date: 6/12/2024    Group Start Time: 1330  Group End Time: 1400  Group Topic: Process Group - Inpatient    SSR 2 [facility]    Dianelys Kim        Group Therapy Note: This writer provided each individual with a handout on \"stress exploration\" and requested each individual to share out loud their triggers and positive ways to cope.     Attendees: 5       Patient's Goal:  to attend groups.    Notes:  Pt able to voice that \"doing chores, having transportation issues and people's tone of voice\" stress him out. Pt stated that \"listening to music, getting some alone time, going for a drive and talking\" helps him calm down.    Status After Intervention:  Improved    Participation Level: Active Listener and Interactive    Participation Quality: Appropriate, Attentive, and Sharing      Speech:  normal      Thought Process/Content: Logical  Linear      Affective Functioning: Congruent      Mood: calm      Level of consciousness:  Alert and Oriented x4      Response to Learning: Able to verbalize current knowledge/experience, Able to verbalize/acknowledge new learning, Able to retain information, Capable of insight, and Able to change behavior      Endings: None Reported    Modes of Intervention: Education and Support      Discipline Responsible: /Counselor      Signature:  Dianelys Kim

## 2024-06-12 NOTE — GROUP NOTE
Group Therapy Note    Date: 6/11/2024    Group Start Time: 1945  Group End Time: 2030  Group Topic: Recreational    SSR 2 [facility] NON ACUTE    Amy Sherwood        Group Therapy Note    Attendees: 6/8     Recreational Therapist facilitated structured leisure skills group to introduce healthy leisure skills as positive way to cope and manage mood.        Patient's Goal:  Client will learn and demonstrate anxiety management skills     Notes:  Attended group and listened to songs with peers.  Pt was receptive to intervention and responded to prompts from staff.     Status After Intervention:  Improved    Participation Level: Active Listener    Participation Quality: Appropriate      Speech:  normal      Thought Process/Content: Logical      Affective Functioning: Congruent      Mood:  calm       Level of consciousness:  Alert      Response to Learning: Progressing to goal      Endings: None Reported    Modes of Intervention: Activity      Discipline Responsible: Recreational Therapist      Signature:  SULEMA Lee

## 2024-06-12 NOTE — GROUP NOTE
Group Therapy Note    Date: 6/12/2024    Group Start Time: 0930  Group End Time: 1000  Group Topic: Nursing    SSR 2 [facility] ACUTE    Radha Mccoy LPN        Group Therapy Note    Attendees: 4       Patient's Goal:  Wants vs.Needs    Notes:  pt.attended group    Status After Intervention:  Unchanged    Participation Level: Active Listener    Participation Quality: Appropriate      Speech:  normal      Thought Process/Content: Logical      Affective Functioning: Congruent      Mood: anxious      Level of consciousness:  Alert      Response to Learning: Able to verbalize current knowledge/experience      Endings: None Reported    Modes of Intervention: Support      Discipline Responsible: Licensed Practical Nurse      Signature:  Radha Mccoy LPN

## 2024-06-13 PROCEDURE — 6370000000 HC RX 637 (ALT 250 FOR IP): Performed by: PSYCHIATRY & NEUROLOGY

## 2024-06-13 PROCEDURE — 6370000000 HC RX 637 (ALT 250 FOR IP): Performed by: INTERNAL MEDICINE

## 2024-06-13 PROCEDURE — 1240000000 HC EMOTIONAL WELLNESS R&B

## 2024-06-13 RX ADMIN — OXCARBAZEPINE 300 MG: 300 TABLET, FILM COATED ORAL at 21:30

## 2024-06-13 RX ADMIN — ASPIRIN 81 MG 81 MG: 81 TABLET ORAL at 08:11

## 2024-06-13 RX ADMIN — TRAZODONE HYDROCHLORIDE 50 MG: 50 TABLET ORAL at 21:30

## 2024-06-13 RX ADMIN — OXCARBAZEPINE 300 MG: 300 TABLET, FILM COATED ORAL at 08:11

## 2024-06-13 NOTE — GROUP NOTE
Group Therapy Note    Date: 6/13/2024    Group Start Time: 1200  Group End Time: 1245  Group Topic: Process Group - Inpatient    [facility]    Sho Perdomo        Group Therapy Note  Writer utilized the Zone chart with Inside out to start group to explain how they were feeling. Writer handed out different positive life quotes and had pts process how they relate to their lives. Pts interacted well with one another and provided positive feedback and support for one another. Writer at times had to redirect the pts to continue to stay on topic. At the end of group pts asked if they could get copies of the quotes that affected them the most.   Attendees: 4-4       Patient's Goal:  \"to call my mom\"    Notes:  Pt shared that his zone was in the Green and felt \"confident\". Pt had good insight on his quotes and shared that strong boundaries and forgiveness is most important to him. Pt shared that he had to set a boundary with him mom because of her substance abuse. He said that when he sees her in the streets he tries to \"avoid\" her because of the way she acts and behaves.    Later in group pt appeared upset so writer asked for him to stay back and to talk about it. Pt shared that hearing the stories from the female pts made him upset because women have to go through more then men do. He said \"I don't even know why I got so emotional in there but I'm letting them out more like we talked about\". Writer provided support and asked the pt to process what he was thinking about when they were talking. Pt sat there for awhile and started to tear up. Pt stated \"I was thinking about calling my mom because she went through a lot of that stuff\". Pt shared that he is able to understand why his mother struggles with MH and substance abuse, as well as himself. Pt shared that he has more empathy for his mother and would like to call her. Pt shared that he

## 2024-06-13 NOTE — GROUP NOTE
Group Therapy Note    Date: 6/12/2024    Group Start Time: 1845  Group End Time: 1930  Group Topic: Recreational    SSR 2 [facility] NON ACUTE    Amy Sherwood        Group Therapy Note    Attendees: 3/4    Recreational Therapist facilitated structured leisure skills group to introduce healthy leisure skills as positive way to cope and manage mood.           Notes:  Did not attend group despite encouragement    Discipline Responsible: Recreational Therapist      Signature:  SULEMA Lee

## 2024-06-13 NOTE — GROUP NOTE
Group Therapy Note    Date: 6/13/2024    Group Start Time: 1335  Group End Time: 1420  Group Topic: Recreational    SSR 2  NON ACUTE    Olamide Garrison        Group Therapy Note    Facilitated leisure skills group to reinforce positive coping and to manage mood through music, social interaction, group activities and art task      Attendees: 4/4       Patient's Goal:  Client will learn and demonstrate anxiety management skills    Notes:  Pt was receptive to listening to music and songs he selected. Interacted with peers and staff. Declined to work on leisure task       Status After Intervention:  Improved    Participation Level: Active Listener and Interactive    Participation Quality: Appropriate      Speech:  normal      Thought Process/Content: Logical      Affective Functioning: Congruent      Mood:  Calm      Level of consciousness:  Attentive      Response to Learning: Progressing to goal      Endings: None Reported    Modes of Intervention: Socialization and Activity      Discipline Responsible: Recreational Therapist      Signature:  SULEMA Gregg

## 2024-06-13 NOTE — GROUP NOTE
Group Therapy Note    Date: 6/13/2024    Group Start Time: 0830  Group End Time: 0900  Group Topic: Medication    SSR 2 [facility] ACUTE    Faviola Hall RN        Group Therapy Note    Attendees: 4 OUT 0F 4       Patient's Goal:  NAMES OF MEDS    Notes:  NAMES/FUNCTIONS OF ALL MEDS REVIEWED    Status After Intervention:  Improved    Participation Level: Active Listener    Participation Quality: Appropriate      Speech:  normal      Thought Process/Content: Logical      Affective Functioning: Congruent      Mood: anxious      Level of consciousness:  Alert      Response to Learning: Able to verbalize current knowledge/experience      Endings: None Reported    Modes of Intervention: Education      Discipline Responsible: Registered Nurse      Signature:  Faviola Hall RN

## 2024-06-13 NOTE — GROUP NOTE
Group Therapy Note    Date: 6/13/2024    Group Start Time: 0945  Group End Time: 1030  Group Topic: Education Group - Inpatient    SSR 2  NON ACUTE    Olamide Garrison        Group Therapy Note    Facilitated group to focus on defining different types of defense mechanisms and being able to recognize examples of some defenses that was used to cope with stress and anxiety       Attendees: 3/4       Notes:  Encouraged but did not attend    Discipline Responsible: Recreational Therapist      Signature:  SULEMA Gregg

## 2024-06-14 VITALS
RESPIRATION RATE: 18 BRPM | BODY MASS INDEX: 21.82 KG/M2 | TEMPERATURE: 98 F | HEIGHT: 74 IN | HEART RATE: 67 BPM | SYSTOLIC BLOOD PRESSURE: 125 MMHG | OXYGEN SATURATION: 100 % | DIASTOLIC BLOOD PRESSURE: 88 MMHG | WEIGHT: 170 LBS

## 2024-06-14 PROCEDURE — 6370000000 HC RX 637 (ALT 250 FOR IP): Performed by: INTERNAL MEDICINE

## 2024-06-14 PROCEDURE — 6370000000 HC RX 637 (ALT 250 FOR IP): Performed by: PSYCHIATRY & NEUROLOGY

## 2024-06-14 RX ORDER — TRAZODONE HYDROCHLORIDE 50 MG/1
50 TABLET ORAL NIGHTLY PRN
Qty: 30 TABLET | Refills: 0 | Status: SHIPPED | OUTPATIENT
Start: 2024-06-14 | End: 2024-07-14

## 2024-06-14 RX ORDER — NICOTINE 21 MG/24HR
1 PATCH, TRANSDERMAL 24 HOURS TRANSDERMAL DAILY
Qty: 30 PATCH | Refills: 0 | Status: SHIPPED | OUTPATIENT
Start: 2024-06-15 | End: 2024-07-15

## 2024-06-14 RX ORDER — ASPIRIN 81 MG/1
81 TABLET, CHEWABLE ORAL DAILY
Qty: 30 TABLET | Refills: 0 | Status: SHIPPED | OUTPATIENT
Start: 2024-06-15 | End: 2024-07-15

## 2024-06-14 RX ORDER — OXCARBAZEPINE 300 MG/1
300 TABLET, FILM COATED ORAL 2 TIMES DAILY
Qty: 60 TABLET | Refills: 0 | Status: SHIPPED | OUTPATIENT
Start: 2024-06-14 | End: 2024-07-14

## 2024-06-14 RX ADMIN — ASPIRIN 81 MG 81 MG: 81 TABLET ORAL at 08:18

## 2024-06-14 RX ADMIN — OXCARBAZEPINE 300 MG: 300 TABLET, FILM COATED ORAL at 08:18

## 2024-06-14 NOTE — GROUP NOTE
Group Therapy Note    Date: 6/14/2024    Group Start Time: 1100  Group End Time: 1125  Group Topic: Process Group - Inpatient    [facility] 2 [facility]    Sho Perdomo        Group Therapy Note  Process group was focused on the pts experiences here in the hospital. Pts shared how they first felt when coming to the hospital and how they are feeling currently. Pts interacted well with one another and provided feedback to one another.   Attendees: 3-5       Patient's Goal:  \"to get my job\"    Notes:  Pt interacted well with others and provided good feedback to others. He shared that in the ED they told him he would be here for only two days and then when he came up here \"they told me a WEEK\". Pt expressed that he was upset and mad but now he is happy he decided to stay. Pt shared that he has learned a lot about himself and tends to continue to work on himself.     Status After Intervention:  Improved    Participation Level: Active Listener and Interactive    Participation Quality: Appropriate, Attentive, Sharing, and Supportive      Speech:  normal      Thought Process/Content: Logical      Affective Functioning: Congruent      Mood: euthymic      Level of consciousness:  Alert, Oriented x4, and Attentive      Response to Learning: Able to verbalize current knowledge/experience, Able to verbalize/acknowledge new learning, Able to retain information, and Progressing to goal      Endings: None Reported    Modes of Intervention: Support, Socialization, and Exploration      Discipline Responsible: /Counselor      Signature:  Sho Perdomo

## 2024-06-14 NOTE — BH NOTE
Affect full. Denied feeling depressed, anxious, and/or suicidal. Pt said he is working on getting his life together, by looking for a job. Compliant with scheduled meds. Pt stated, \"I took my nicotine patch off, last night, and I still had a bad dream.\"  Appetite good; consumed 100% of meals and snacks. Showered, and changed his clothes. Attended groups. Observed interacting appropriately, with others. Q 15 mins checks maintained, for safety.    
Behavioral Health Bed Placement Note:  Patient has been accepted by Dr Nichols to [facility] bed 235-2. Pt will not need a one to one upon entrance to U per Dr Nichols; to be reassessed by admission nurse on unit.  
Behavioral Health Treatment Team Note     Patient goal(s) for today: \"to be discharged\"  Treatment team focus/goals: continue medication management, group therapy, maintain Adls and provide a safe discharge    Progress note: Pt presented with a bright affect and congruent mood. Pt denied any SI/HI/Avh at the time and was orientated x4. Pt shared that he was disappointed about not leaving today but it ok with leaving tomorrow. Pt completed a safety plan with the writer. Pt continues to attend groups and participate. Pt reported that he missed the morning group because he was sleeping and did not here them call for it. Pt will be discharging to [facility] tomorrow. Pt will take a [employer] taxi and medications should be sent to [facility] [address] [place] [phone]. An inpatient level of care is needed to coordinate a safe discharge     LOS:  4  Expected LOS: 5 days     Insurance info/prescription coverage:  [facility]   Date of last family contact:  6-11-24  Family requesting physician contact today:  No  Discharge plan:  to step down with [facility] D[facility]  Guns in the home:  No   Outpatient provider(s):  Leonila Dos Santos    Participating treatment team members: Alejandra Soriano, * (assigned SW), Sho Perdomo LMSW  
Behavioral Health Treatment Team Note     Patient goal(s) for today: \"to continue to work on myself\"  Treatment team focus/goals: continue medication management, group therapy, maintain ADLs and provide a safe discharge    Progress note: Pt presented with a broad affect and \"good\" mood. Pt denied any SI/HI/avh at the time and was orientated x4. Pt was well groomed and making his bed when the writer entered the room. Pt shared that he read some of the material and that has helped him with ideas on how to take care of himself. Pt appreciated the discovery questions provided by the writer. He said \"I'm really glad I came in here because it was good for my mind\". Pt shared that he did not realize he focused on his relationship and not himself anymore. 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 Pts goals are to gain employment, contact individuals about his child support and work on getting his GED. Writer reached out to [facility] and will send over his clinicals. An inpatient level of care is needed to further stabilize the pt and coordinate a safe discharge    Writer spoke with pts girlfriend and she shared that pt has been battling with depression and can \"explode\" out of no where. Pt has a difficult time with communication and taking constructive criticism. She reported that pt was on medications in the past but stopped taking them as a child.      LOS:  2  Expected LOS: 5-7 days     Insurance info/prescription coverage:  [facility] Medicaid   Date of last family contact:  writer tried to call pts cousin and could not leave a VM  Family requesting physician contact today:  No  Discharge plan:  to stabilize the pt   Guns in the home:  No   Outpatient provider(s):  will be coordinated prior to discharge    Participating treatment team members: Alejandra Soriano, * (assigned SW), Sho Perdomo LMSW  
Behavioral Health Treatment Team Note     Patient goal(s) for today: \"to stay positive\"  Treatment team focus/goals: continue medication management, group therapy, maintain ADLs and provide a safe discharge    Progress note: Pt presented with a broad affect and congruent mood. Pt denied any SI/HI/Avh at the time and was orientated x4. Pt shared that he is motivated to continue to grow in a positive direction when he leaves the hospital. He shared that he spoke with his girlfriend last night and when the conversation started to get stressful he kept calm and told her that they can figure it out when he discharges. Pt stated that he felt \"good\" about placing the boundary and not stressing over something that is out of his control. Pt and writer went over different scenarios where he might find himself overwhelmed in a conversation and role played. Pt feels that using \"safe words like popcorn\" would help him when he is starting to get frustrated. Pt reported a pain in his chest yesterday \"out of no where\" and thought that it might be anxiety. Pt shared that he reported it to the nurse and practiced deep breathing. An inpatient level of care is needed to further monitor the pts anxiety.    LOS:  3  Expected LOS: 5 days     Insurance info/prescription coverage:  [facility] Medicaid   Date of last family contact:  6-11-24  Family requesting physician contact today:  No  Discharge plan:  to monitor the pt  Guns in the home:  No   Outpatient provider(s):  Leonila Dos Santos     Participating treatment team members: Alejandra Soriano, * (assigned SW), Sho Perdomo LMSW  
Client is pleasant and cooperative.Alert and oriented x 3.He has a good appetite and reports sleeping well.Denies feeling suicidal or homicidal.Takes meds as prescribed and denies any side effects.Reports that he is ready to move forward with his life and do better things.Reports that he is planning to get his job back here.Reports that the groups have been great and helpful.Denies anxiety and depression.Remains on close observation for safety.  
Client was given discharge information along with his personal belongings and valuables.Client verbalized understanding discharge plans.Escorted to the lobby by staff.  
DAY SHIFT    Pt up ad vira on unit. Pt presents with bright affect and overall good mood. Pt takes medications without difficulty and was started on a new medication today. Pt denies depression and anxiety. Pt denies SI/HI. Pt denies AVH. Pt talks with this writer in hopes to get a job soon and to get his life back on the right track. Pt is attending groups. Pt is cooperative and pleasant. Close observations continued to ensure pt safety.   
DISCHARGE SUMMARY    NAME:Alejandra Soriano  : 1995  MRN: [id number]    The patient Alejandra Soriano exhibits the ability to control behavior in a less restrictive environment.  Patient's level of functioning is improving.  No assaultive/destructive behavior has been observed for the past 24 hours.  No suicidal/homicidal threat or behavior has been observed for the past 24 hours.  There is no evidence of serious medication side effects.  Patient has not been in physical or protective restraints for at least the past 24 hours.    If weapons involved, how are they secured? N/a    Is patient aware of and in agreement with discharge plan? yes    Arrangements for medication:  Prescriptions will be sent to [facility] [address] [place] [address] [phone]    Copy of discharge instructions to provider?:  yes    Arrangements for transportation home:  Pt will take a fast cab     Keep all follow up appointments as scheduled, continue to take prescribed medications per physician instructions.  Mental health crisis number:  911 or your local mental health crisis line number at [phone]      Mental Health Emergency WARM LINE      [phone]-MHAV ([phone])      M-F: 9am to 9pm      Sat & Sun: 5pm - 9pm  National suicide prevention lines:                             9-327-BXEEIFY (1-183.945.6944)       6-321-018-TALK (1-726.747.2430)    Crisis Text Line:  Text HOME to 069946  
Nurse Note:      Patient is visible in the dayroom this evening; observed smiling and has a bright affect. Denies SI, HI, A/V hallucinations. No report of anxiety and depression. No c/o pain. CIWA=0; no w/d symptoms. Patient is medication compliant; requested Prn Trazodone for sleep. Observed eating and drinking during snack time. Patient is currently resting quietly with eyes closed; will continue to monitor for safety.  
PSA PART II ADDITIONAL INFORMATION        Access To Fire Arms: No    Substance Use: Yes    Last Use: 6-9-24    Type of Substance: marijuana, alcohol, and cocaine    Frequency of Use: daily     Request to See : No    If yes, notified : No    Guardian:No    Guardian Contact:Pt is his own legal guardian     Release of Information Signed: Yes    Release of Information Signed For: Luis Low [phone] Green [phone]    Goal: \"to get my anxiety and to work on myself\"  
PSYCHOSOCIAL ASSESSMENT  :Patient identifying info:   Alejandra Soriano is a 28 y.o., male admitted 6/9/2024 12:02 PM     Presenting problem and precipitating factors: Pt was admitted to the ED with SI for \"some time\" and they started about a week ago. Pt reported hx of mental health and current substance use. Pt reported currently using THC, cocaine and alcohol. Pt reported that he is currently experiencing relationship problems and has not seen his daughter for a while. Pt recently lost his job as well a week ago because he was not able to get there in time due to lack of transportation.     Pt reported increased depression, loss of interests, sleep and appetite. Pt also reported racing thoughts, irritable crying spells and anxiety.     Mental status assessment: Pt presented with a broad affect and congruent mood. Pt denied any SI/HI/avh at the time and was orientated x4. Pt was well groomed and pleasant to speak with. Pts thoughts were clear, organized and logical. Pts speech was clear and appropriate. Pt has good insight and fair judgement. Pt did not appear to have a cognitive or memory impairment    Strengths/Recreation/Coping Skills:Pt likes to play on his phone, listen to music and play basketball.     Collateral information: Luis Low [phone] Green [phone]     Current psychiatric /substance abuse providers and contact info: Pt does not have a current treatment team    Previous psychiatric/substance abuse providers and response to treatment: Pts first hospitalization     Family history of mental illness or substance abuse: Pts mother has a hx of mental health and substance abuse     Substance abuse history:    Social History     Tobacco Use    Smoking status: Every Day     Current packs/day: 1.00     Types: Cigarettes    Smokeless tobacco: Never   Substance Use Topics    Alcohol use: Yes     Comment: \"1 bottle of liquor a day\"       History of biomedical complications associated with 
Patient observed up on unit in day room watching television, talking on the telephone, and socializing with peers.  He is extremely polite and grateful for the care he has received.  Bright, confident affect.  Reports the groups have really helped him with coping skills and expressing his feelings.  Patient states \"I'm looking forward to being discharged tomorrow.  My first goal is to get a job, hopefully back here where people know me and can check on how I'm doing.  The  even prayed for me yesterday.  I have to remember not be so concerned with how other people  me.  I need to do good for me and not get caught up in all that other stuff.  I want to get a tattoo on my arm that says \"FLY\" for first love yourself.  I want to get on a good path and stay there.\"  Denies anxiety, depression, SI, HI, or AVH.  Requested prn Trazodone at bedtime.  Patient currently resting quietly in bed, respirations even and unlabored. Treatment plan ongoing.  Remains on close observation every 15 minutes for patient safety.      
Patient was pleasant, calm and cooperative, friendly to staff. He sat in day room watching tv with peers, eating snack/drink and using the telephone until 2200 when the day room closed and he went to bed. He woke up during the night with some anxiety and could not fall back asleep so an atarax was given. Patient rated his depression of  7-8 out of 10, anxiety 5/10, and no longer having SI, denies HI and hallucinations. He appears to be resting well the rest of the night with no signs of distress noted, respirations regular and unlabored. Will continue to monitor patient closely every 15 mins as per unit protocol.   
Pt came to nurses station with c/o chest pain at this time. Pt reports pain in epigastric region. Pt denies SOB, N/V, dizziness. Pt states he was watching TV in the dayroom when it happened. Pt VS taken at 1652- bp 125/88, hr 83, rr 19, o2 100%. Pt states pain has subsided since taking deep breaths. Writer offered pt prn atarax at 1700 and pt accepted. Writer paged Dr. Kelly at this time and Dr. Kelly returned call. Per Dr. Kelly, order EKG and aspirin 81mg daily. EKG tech notified to complete. Aspirin 81mg given at 1715. Per Dr. Kelly, notify if pt continues to have chest pain and he will review possible transfer to medical floor if pain continues.     1826- Pt denies chest pain at this time and has no further physical complaints.   
Pt up ad vira on unit pleasant and cooperative with staff and peers. Pt denied depression, anxiety, SI/HI/AVH. Pt denies any pain or physical complaints. CIWA= 0 on administration of librium at 0831. Pt accepted scheduled medication without difficulty and denied the need for prns. Pt states he slept well and ate 100% of breakfast. Q 15 min checks continued to ensure pt safety on unit.   
Pt.is up and visible on unit, affect is bright,good appetite ,appearance is neat, motivated to get a job.denies feeling suicidal or depressed, denies hallucinations ,expressed he wants to stop drinking and using THC, insight is improving,pt.wants to help self improve.no other concerns voiced, remains on close observation.  
The pt attended the evening group session and was visible in the milieu throughout the evening. He interacted with his peers and played a board game / cards. Pt denies having any A/D/SI/HI or A/VH. He has a brightened affect. The pt has been pleasant, cooperative, and thankful for his care. No distress noted or voiced. Pt accepted snacks and something to drink. He is med compliant. No c/o pain or discomfort. Pt remains A+O and ambulates independently.     The pt has been resting quietly in bed with his eyes closed. No distress noted or voiced. Respirations are quiet and unlabored. Close observation maintained for safety.    
The pt attended the evening group session and watched T.V in the milieu. Pt noted interacting with his peers. He has a bright affect. Pt denies having any A/D/SI/HI or A/VH. Pt stated that he no longer has any interest in using any drugs or alcohol. He stated that he had thoughts about smoking a cigarette a little bit, but that was it. Pt stated that he is going to try to get a job, here, in the hospital. Pt stated that he wants to take better care of himself and his daughter. According to the pt, developing better worth ethics is on the top of his list. The pt has been polite, respectful, calm, and cooperative. He is med compliant and received prn Trazodone for sleep. Pt accepted snacks and something to drink. Pt remains A+O and ambulates independently.     The pt is resting quietly in bed without any distress. Respirations are quiet and unlabored. Close observation maintained for safety.     Pt rested quietly in bed throughout the night. Pt noted to be awake at 0500, but did go back to sleep.     Approx. Hrs of sleep - 8.5 hrs  
This 28 yea old  male is being admitted to Behavioral Health under the professional services of Dr. Nichols with an admitting diagnosis of Major Depression with suicidal ideations.Client is pleasant upon approach but did become tearful throughout the admission process.Alert and oriented x 3.Sad affect and depressed mood.Client admits to drinking and smoking pot daily.He also uses cocaine weekly,.Reports decrease in appetite and finds it difficult to fall asleep and stay asleep.Reports feeling anxious and frequent crying spells.Reports that he has been under stress and recently lost his job at [employer] due to not having a car.Issues with not being able to see his 11 year old daughter and child support due and  no money to pay.Reports having a number of jobs but unable to hold them for one reason or another.Reports that he is having family issues as well as relationship problem with girlfriend.Denies feeling suicidal at this time.Client will be placed on close observation for safety,  
Range    Color, UA Yellow/Straw      Appearance Clear Clear      Specific Gravity, UA 1.018 1.003 - 1.030      pH, Urine 6.0 5.0 - 8.0      Protein, UA Negative Negative mg/dL    Glucose, Ur Negative Negative mg/dL    Ketones, Urine Negative Negative mg/dL    Bilirubin, Urine Negative Negative      Blood, Urine Negative Negative      Urobilinogen, Urine 0.1 0.1 - 1.0 EU/dL    Nitrite, Urine Negative Negative      Leukocyte Esterase, Urine Negative Negative      WBC, UA 10-20 0 - 4 /hpf    RBC, UA 0-5 0 - 5 /hpf    Epithelial Cells, UA Few Few /lpf    Mucus, UA Trace /lpf    BACTERIA, URINE Negative Negative /hpf   EKG 12 Lead   Result Value Ref Range    Ventricular Rate 56 BPM    Atrial Rate 56 BPM    P-R Interval 142 ms    QRS Duration 98 ms    Q-T Interval 410 ms    QTc Calculation (Bazett) 395 ms    P Axis 4 degrees    R Axis 66 degrees    T Axis 54 degrees    Diagnosis       Sinus bradycardia  Otherwise normal ECG  When compared with ECG of 20-JUL-2023 18:06,  Vent. rate has decreased BY  43 BPM  QT has shortened  Confirmed by FERNANDO LONGORIA ([id number]) on 6/10/2024 7:23:39 PM         Immunizations administered during this encounter:   Immunization History   Administered Date(s) Administered    TD 5LF, TENIVAC, (age 7y+), IM, 0.5mL 07/20/2023       Screening for Metabolic Disorders for Patients on Antipsychotic Medications  (Data obtained from the EMR)    Estimated Body Mass Index  Estimated body mass index is 21.83 kg/m² as calculated from the following:    Height as of this encounter: 1.88 m (6' 2\").    Weight as of this encounter: 77.1 kg (170 lb).     Vital Signs/Blood Pressure  BP (!) 126/93   Pulse 81   Temp 98.1 °F (36.7 °C) (Oral)   Resp 18   Ht 1.88 m (6' 2\")   Wt 77.1 kg (170 lb)   SpO2 100%   BMI 21.83 kg/m²     Blood Glucose/Hemoglobin A1c  No results found for: \"GLU\", \"GLUCPOC\"    No results found for: \"HBA1C\"     Lipid Panel  No results found for: \"CHOL\", \"CHLST\", \"CHOLV\", \"035921\", \"HDL\",

## 2024-06-14 NOTE — H&P
Initial Psychiatric History & Physical (H&P)     [facility]  PSYCH HISTORY AND PHYSICAL    Name: Alejandra Soriano  YOB: 1995  Age: 28 y.o.  MRN#: [id number]  [facility]#: [id number]  Admission Date: 6/9/2024  Today's Date: 6/14/2024      Chief Compliant & Diagnosis:     had concerns including Mental Health Problem.        Suicidal ideation    Principal Problem:    Major depressive disorder, severe (HCC)  Active Problems:    Depression  Resolved Problems:    * No resolved hospital problems. *      History of Present Illness (HPI):   The patient is a single,  male that is 28 years old.  He presented to the emergency department with suicidal thoughts with a vague plan without intent. Patient reported that he has been feeling depressed for \"some time\", but just recently started having these suicidal thoughts about a week ago. Reported that he is \"under a lot of stress\" and then reported that he lost his job about a week ago due to transportation issues. He also reported that his daughter just recently had her 11th birthday on May 21st and he has been unable to see her or spend time with her. He stated that child support just began last month and he just lost his job. He also stated that he is having some issues in his current relationship with trust issues and also feeling as  if he isn't being \"heard\". The following symptoms have been reported by the patient - suicidal thoughts, thoughts to cut himself, depressed mood, loss of interest in playing basketball, can't fall asleep, has no appetite unless he is smoking marijuana, some irritability, racing thoughts, crying spells, anxiety and substance use.      He has never been hospitalized before but has a history with mental health. He reported that he was a client with D[facility] as a child. He also reported that his mother has a history of drug abuse and mental health issues. Endorses only medical issue is scoliosis of the spine. No 
Javier DUTTON MD, 81 mg at 06/10/24 1715    acetaminophen (TYLENOL) tablet 650 mg, 650 mg, Oral, Q4H PRN, Maria D Nichols MD    nicotine (NICODERM CQ) 21 MG/24HR 1 patch, 1 patch, TransDERmal, Daily, Maria D Nichols MD, 1 patch at 06/10/24 0832    traZODone (DESYREL) tablet 50 mg, 50 mg, Oral, Nightly PRN, Maria D Nichols MD, 50 mg at 06/09/24 2137    hydrOXYzine HCl (ATARAX) tablet 25 mg, 25 mg, Oral, TID PRN, Maria D Nichols MD, 25 mg at 06/10/24 1700    aluminum & magnesium hydroxide-simethicone (MAALOX) 200-200-20 MG/5ML suspension 30 mL, 30 mL, Oral, Q6H PRN, Maria D Nichols MD    chlordiazePOXIDE (LIBRIUM) capsule 25 mg, 25 mg, Oral, BID, Maria D Nichols MD, 25 mg at 06/10/24 0831    Disposition:   Admit to inpatient   Initiate suicide and elopement precautions  Initiate CBT - individual and group  Start patient on oxcarbazepine 300 mg PO BID for mood stabilization   Gather collateral information from previous medical records and relatives/friends      Length of Stay:   3 to 5 days      Electronically signed by Wayne Soriano on 6/10/24 at 6:24 PM EDT

## 2024-06-14 NOTE — PROGRESS NOTES
Hospitalist Progress Note  [facility]    Subjective:   Daily Progress Note: 2024 7:52 AM    No new complaints    Current Facility-Administered Medications   Medication Dose Route Frequency    chlordiazePOXIDE (LIBRIUM) capsule 10 mg  10 mg Oral 4x Daily PRN    OXcarbazepine (TRILEPTAL) tablet 300 mg  300 mg Oral BID    aspirin chewable tablet 81 mg  81 mg Oral Daily    acetaminophen (TYLENOL) tablet 650 mg  650 mg Oral Q4H PRN    nicotine (NICODERM CQ) 21 MG/24HR 1 patch  1 patch TransDERmal Daily    traZODone (DESYREL) tablet 50 mg  50 mg Oral Nightly PRN    hydrOXYzine HCl (ATARAX) tablet 25 mg  25 mg Oral TID PRN    aluminum & magnesium hydroxide-simethicone (MAALOX) 200-200-20 MG/5ML suspension 30 mL  30 mL Oral Q6H PRN        Review of Systems  Constitutional: negative for fevers, chills, sweats, and fatigue  Eyes: negative for redness and icterus  Ears, nose, mouth, throat, and face: negative for ear drainage, nasal congestion, sore throat, and voice change  Respiratory: negative for cough, wheezing, or dyspnea on exertion  Cardiovascular: negative for chest pain, dyspnea, palpitations, lower extremity edema  Gastrointestinal: negative for nausea, vomiting, diarrhea, and abdominal pain  Genitourinary:negative for frequency, dysuria, and hematuria  Integument/breast: negative for skin lesion(s) and dryness  Hematologic/lymphatic: negative for easy bruising, bleeding, and lymphadenopathy  Musculoskeletal:negative for neck pain, back pain, and muscle weakness  Neurological: negative for headaches, dizziness, and weakness  Behavioral/Psych: negative for anxiety and depression  Allergic/Immunologic: negative for urticaria and angioedema        Objective:     /88   Pulse 67   Temp 98 °F (36.7 °C) (Oral)   Resp 18   Ht 1.88 m (6' 2\")   Wt 77.1 kg (170 lb)   SpO2 100%   BMI 21.83 kg/m²         Temp (24hrs), Av.4 °F (36.9 °C), Min:98 °F (36.7 °C), Max:98.7 °F (37.1 
 Hospitalist Progress Note  [facility]    Subjective:   Daily Progress Note: 2024 8:14 AM    No new complaints    Current Facility-Administered Medications   Medication Dose Route Frequency    chlordiazePOXIDE (LIBRIUM) capsule 10 mg  10 mg Oral 4x Daily PRN    OXcarbazepine (TRILEPTAL) tablet 300 mg  300 mg Oral BID    aspirin chewable tablet 81 mg  81 mg Oral Daily    acetaminophen (TYLENOL) tablet 650 mg  650 mg Oral Q4H PRN    nicotine (NICODERM CQ) 21 MG/24HR 1 patch  1 patch TransDERmal Daily    traZODone (DESYREL) tablet 50 mg  50 mg Oral Nightly PRN    hydrOXYzine HCl (ATARAX) tablet 25 mg  25 mg Oral TID PRN    aluminum & magnesium hydroxide-simethicone (MAALOX) 200-200-20 MG/5ML suspension 30 mL  30 mL Oral Q6H PRN        Review of Systems  Constitutional: negative for fevers, chills, sweats, and fatigue  Eyes: negative for redness and icterus  Ears, nose, mouth, throat, and face: negative for ear drainage, nasal congestion, sore throat, and voice change  Respiratory: negative for cough, wheezing, or dyspnea on exertion  Cardiovascular: negative for chest pain, dyspnea, palpitations, lower extremity edema  Gastrointestinal: negative for nausea, vomiting, diarrhea, and abdominal pain  Genitourinary:negative for frequency, dysuria, and hematuria  Integument/breast: negative for skin lesion(s) and dryness  Hematologic/lymphatic: negative for easy bruising, bleeding, and lymphadenopathy  Musculoskeletal:negative for neck pain, back pain, and muscle weakness  Neurological: negative for headaches, dizziness, and weakness  Behavioral/Psych: negative for anxiety and depression  Allergic/Immunologic: negative for urticaria and angioedema        Objective:     /84   Pulse 63   Temp 98 °F (36.7 °C) (Oral)   Resp 17   Ht 1.88 m (6' 2\")   Wt 77.1 kg (170 lb)   SpO2 100%   BMI 21.83 kg/m²         Temp (24hrs), Av.2 °F (36.8 °C), Min:98 °F (36.7 °C), Max:98.4 °F (36.9 
 Hospitalist Progress Note  [facility]    Subjective:   Daily Progress Note: 2024 8:18 AM    No new complaints    Current Facility-Administered Medications   Medication Dose Route Frequency    chlordiazePOXIDE (LIBRIUM) capsule 10 mg  10 mg Oral 4x Daily PRN    OXcarbazepine (TRILEPTAL) tablet 300 mg  300 mg Oral BID    aspirin chewable tablet 81 mg  81 mg Oral Daily    acetaminophen (TYLENOL) tablet 650 mg  650 mg Oral Q4H PRN    nicotine (NICODERM CQ) 21 MG/24HR 1 patch  1 patch TransDERmal Daily    traZODone (DESYREL) tablet 50 mg  50 mg Oral Nightly PRN    hydrOXYzine HCl (ATARAX) tablet 25 mg  25 mg Oral TID PRN    aluminum & magnesium hydroxide-simethicone (MAALOX) 200-200-20 MG/5ML suspension 30 mL  30 mL Oral Q6H PRN        Review of Systems  Constitutional: negative for fevers, chills, sweats, and fatigue  Eyes: negative for redness and icterus  Ears, nose, mouth, throat, and face: negative for ear drainage, nasal congestion, sore throat, and voice change  Respiratory: negative for cough, wheezing, or dyspnea on exertion  Cardiovascular: negative for chest pain, dyspnea, palpitations, lower extremity edema  Gastrointestinal: negative for nausea, vomiting, diarrhea, and abdominal pain  Genitourinary:negative for frequency, dysuria, and hematuria  Integument/breast: negative for skin lesion(s) and dryness  Hematologic/lymphatic: negative for easy bruising, bleeding, and lymphadenopathy  Musculoskeletal:negative for neck pain, back pain, and muscle weakness  Neurological: negative for headaches, dizziness, and weakness  Behavioral/Psych: negative for anxiety and depression  Allergic/Immunologic: negative for urticaria and angioedema        Objective:     BP (!) 126/93   Pulse 81   Temp 98.1 °F (36.7 °C) (Oral)   Resp 18   Ht 1.88 m (6' 2\")   Wt 77.1 kg (170 lb)   SpO2 100%   BMI 21.83 kg/m²         Temp (24hrs), Av.2 °F (36.8 °C), Min:98.1 °F (36.7 °C), Max:98.2 °F (36.8 
Alejandra actively participated in Spirituality [facility] on acute Behavioral Health unit.     Chaplain Romel Cooley M.Div, M.S, [facility]   can be reached by calling the  at [facility]   [phone]  
PSYCHIATRIC PROGRESS NOTE         Patient Name  Alejandra Soriano   Date of Birth 1995   [facility] [id number]   Medical Record Number  [id number]      Age  28 y.o.   PCP No primary care provider on file.   Admit date:  6/9/2024    Room Number  233/01   [facility]   Date of Service  6/12/2024            HISTORY OF PRESENT ILLNESS/INTERVAL HISTORY:              MENTAL STATUS EXAM & VITALS                    VITALS:     Patient Vitals for the past 24 hrs:   Temp Pulse Resp BP SpO2   06/12/24 2030 -- 82 -- (!) 125/91 --   06/12/24 1904 98.4 °F (36.9 °C) 82 18 (!) 125/91 --   06/12/24 0856 -- 81 -- (!) 126/93 --   06/12/24 0705 98.1 °F (36.7 °C) 81 18 (!) 126/93 100 %     Wt Readings from Last 3 Encounters:   06/09/24 77.1 kg (170 lb)   03/25/24 77.1 kg (170 lb)   08/09/23 79.4 kg (175 lb)     Temp Readings from Last 3 Encounters:   06/12/24 98.4 °F (36.9 °C) (Oral)   03/25/24 98.1 °F (36.7 °C) (Oral)   08/09/23 98.6 °F (37 °C) (Oral)     BP Readings from Last 3 Encounters:   06/12/24 (!) 125/91   03/25/24 (!) 147/101   08/09/23 (!) 141/91     Pulse Readings from Last 3 Encounters:   06/12/24 82   03/25/24 64   08/09/23 66            DATA     LABORATORY DATA:(reviewed/updated 6/12/2024)  No results found for this or any previous visit (from the past 24 hour(s)).   No results found for: \"VALAC\", \"VALP\", \"CARB2\"  No results found for: \"LITHM\"   RADIOLOGY REPORTS:(reviewed/updated 6/12/2024)  No results found.       MEDICATIONS     ALL MEDICATIONS:   Current Facility-Administered Medications   Medication Dose Route Frequency    chlordiazePOXIDE (LIBRIUM) capsule 10 mg  10 mg Oral 4x Daily PRN    OXcarbazepine (TRILEPTAL) tablet 300 mg  300 mg Oral BID    aspirin chewable tablet 81 mg  81 mg Oral Daily    acetaminophen (TYLENOL) tablet 650 mg  650 mg Oral Q4H PRN    nicotine (NICODERM CQ) 21 MG/24HR 1 patch  1 patch TransDERmal Daily    traZODone (DESYREL) tablet 50 mg  50 mg Oral Nightly PRN    hydrOXYzine HCl 
PSYCHIATRIC PROGRESS NOTE         Patient Name  Alejandra Soriano   Date of Birth 1995   [facility] [id number]   Medical Record Number  [id number]      Age  28 y.o.   PCP No primary care provider on file.   Admit date:  6/9/2024    Room Number  235/02   [facility]   Date of Service  6/11/2024            HISTORY OF PRESENT ILLNESS/INTERVAL HISTORY:              MENTAL STATUS EXAM & VITALS                    VITALS:     Patient Vitals for the past 24 hrs:   Temp Pulse Resp BP SpO2   06/11/24 2000 -- 80 -- (!) 123/93 --   06/11/24 1903 98.2 °F (36.8 °C) 80 17 (!) 123/93 99 %   06/11/24 0922 -- 62 -- (!) 133/91 --   06/11/24 0727 98.1 °F (36.7 °C) 62 17 (!) 133/91 --     Wt Readings from Last 3 Encounters:   06/09/24 77.1 kg (170 lb)   03/25/24 77.1 kg (170 lb)   08/09/23 79.4 kg (175 lb)     Temp Readings from Last 3 Encounters:   06/11/24 98.2 °F (36.8 °C) (Oral)   03/25/24 98.1 °F (36.7 °C) (Oral)   08/09/23 98.6 °F (37 °C) (Oral)     BP Readings from Last 3 Encounters:   06/11/24 (!) 123/93   03/25/24 (!) 147/101   08/09/23 (!) 141/91     Pulse Readings from Last 3 Encounters:   06/11/24 80   03/25/24 64   08/09/23 66            DATA     LABORATORY DATA:(reviewed/updated 6/11/2024)  No results found for this or any previous visit (from the past 24 hour(s)).   No results found for: \"VALAC\", \"VALP\", \"CARB2\"  No results found for: \"LITHM\"   RADIOLOGY REPORTS:(reviewed/updated 6/11/2024)  No results found.       MEDICATIONS     ALL MEDICATIONS:   Current Facility-Administered Medications   Medication Dose Route Frequency    chlordiazePOXIDE (LIBRIUM) capsule 10 mg  10 mg Oral 4x Daily PRN    OXcarbazepine (TRILEPTAL) tablet 300 mg  300 mg Oral BID    aspirin chewable tablet 81 mg  81 mg Oral Daily    acetaminophen (TYLENOL) tablet 650 mg  650 mg Oral Q4H PRN    nicotine (NICODERM CQ) 21 MG/24HR 1 patch  1 patch TransDERmal Daily    traZODone (DESYREL) tablet 50 mg  50 mg Oral Nightly PRN    hydrOXYzine HCl 
recorded.    PHYSICAL EXAM:  Skin: Extremities and face reveal no rashes.   HEENT: Sclerae anicteric. Extra-occular muscles are intact. No oral ulcers. No ENT discharge. The neck is supple.   Cardiovascular: Regular rate and rhythm. No murmurs, gallops, or rubs. PMI nondisplaced.   Respiratory:  Clear breath sounds bilaterally with no wheezes, rales, or rhonchi.   GI: Abdomen nondistended, soft, and nontender. Normal active bowel sounds. No enlargement of the liver or spleen. No masses palpable.   Rectal: Deferred   Musculoskeletal: No pitting edema of the lower legs. Extremities have good range of motion. No costovertebral tenderness.   Neurological:  Patient is alert and oriented. Cranial nerves II-XII grossly intact  Psychiatric: Mood appears appropriate with judgement intact.   Lymphatic: No cervical or supraclavicular adenopathy.    Additional comments:I reviewed the patient's new clinical lab test results.      Data Review    Recent Results (from the past 24 hour(s))   EKG 12 Lead    Collection Time: 06/10/24  5:10 PM   Result Value Ref Range    Ventricular Rate 56 BPM    Atrial Rate 56 BPM    P-R Interval 142 ms    QRS Duration 98 ms    Q-T Interval 410 ms    QTc Calculation (Bazett) 395 ms    P Axis 4 degrees    R Axis 66 degrees    T Axis 54 degrees    Diagnosis       Sinus bradycardia  Otherwise normal ECG  When compared with ECG of 20-JUL-2023 18:06,  Vent. rate has decreased BY  43 BPM  QT has shortened  Confirmed by FERNANDO LONGORIA ([id number]) on 6/10/2024 7:23:39 PM           Assessment/Plan:     Principal Problem:    Major depressive disorder, severe (HCC)  Active Problems:    Depression  Resolved Problems:    * No resolved hospital problems. *        Care Plan discussed with:     Total time spent with patient: 15 minutes.    
directly by or requiring the supervision of inpatient psychiatric facility personnel. In addition, the hospital records show that services furnished were intensive treatment services.    Signed By:   Ravinder Zazueta MD  6/13/2024

## 2024-06-14 NOTE — PLAN OF CARE
Problem: Anxiety  Goal: Will report anxiety at manageable levels  Description: INTERVENTIONS:  1. Administer medication as ordered  2. Teach and rehearse alternative coping skills  3. Provide emotional support with 1:1 interaction with staff  6/10/2024 2242 by Sahra Ling RN  Outcome: Progressing  6/10/2024 0939 by Macy Mo RN  Outcome: Progressing     Problem: Coping  Goal: Pt/Family able to verbalize concerns and demonstrate effective coping strategies  Description: INTERVENTIONS:  1. Assist patient/family to identify coping skills, available support systems and cultural and spiritual values  2. Provide emotional support, including active listening and acknowledgement of concerns of patient and caregivers  3. Reduce environmental stimuli, as able  4. Instruct patient/family in relaxation techniques, as appropriate  5. Assess for spiritual pain/suffering and initiate Spiritual Care, Psychosocial Clinical Specialist consults as needed  6/10/2024 2242 by Sahra Ling RN  Outcome: Progressing  6/10/2024 0939 by Macy Mo RN  Outcome: Progressing     
  Problem: Anxiety  Goal: Will report anxiety at manageable levels  Description: INTERVENTIONS:  1. Administer medication as ordered  2. Teach and rehearse alternative coping skills  3. Provide emotional support with 1:1 interaction with staff  Outcome: Progressing     Problem: Behavior  Goal: Pt/Family maintain appropriate behavior and adhere to behavioral management agreement, if implemented  Description: INTERVENTIONS:  1. Assess patient/family's coping skills and  non-compliant behavior (including use of illegal substances)  2. Notify security of behavior or suspected illegal substances which indicate the need for search of the family and/or belongings  3. Encourage verbalization of thoughts and concerns in a socially appropriate manner  4. Utilize positive, consistent limit setting strategies supporting safety of patient, staff and others  5. Encourage participation in the decision making process about the behavioral management agreement  6. If a visitor's behavior poses a threat to safety call refer to organization policy.  7. Initiate consult with , Psychosocial CNS, Spiritual Care as appropriate  Outcome: Progressing     Problem: Depression/Self Harm  Goal: Effect of psychiatric condition will be minimized and patient will be protected from self harm  Description: INTERVENTIONS:  1. Assess impact of patient's symptoms on level of functioning, self care needs and offer support as indicated  2. Assess patient/family knowledge of depression, impact on illness and need for teaching  3. Provide emotional support, presence and reassurance  4. Assess for possible suicidal thoughts or ideation. If patient expresses suicidal thoughts or statements do not leave alone, initiate Suicide Precautions, move to a room close to the nursing station and obtain sitter  5. Initiate consults as appropriate with Mental Health Professional, Spiritual Care, Psychosocial CNS, and consider a recommendation to the LIP for 
  Problem: Behavior  Goal: Pt/Family maintain appropriate behavior and adhere to behavioral management agreement, if implemented  Description: INTERVENTIONS:  1. Assess patient/family's coping skills and  non-compliant behavior (including use of illegal substances)  2. Notify security of behavior or suspected illegal substances which indicate the need for search of the family and/or belongings  3. Encourage verbalization of thoughts and concerns in a socially appropriate manner  4. Utilize positive, consistent limit setting strategies supporting safety of patient, staff and others  5. Encourage participation in the decision making process about the behavioral management agreement  6. If a visitor's behavior poses a threat to safety call refer to organization policy.  7. Initiate consult with , Psychosocial CNS, Spiritual Care as appropriate  6/12/2024 2232 by Delmy Ruiz, RN  Outcome: Progressing     Problem: Coping  Goal: Pt/Family able to verbalize concerns and demonstrate effective coping strategies  Description: INTERVENTIONS:  1. Assist patient/family to identify coping skills, available support systems and cultural and spiritual values  2. Provide emotional support, including active listening and acknowledgement of concerns of patient and caregivers  3. Reduce environmental stimuli, as able  4. Instruct patient/family in relaxation techniques, as appropriate  5. Assess for spiritual pain/suffering and initiate Spiritual Care, Psychosocial Clinical Specialist consults as needed  6/13/2024 0847 by Faviola Hall, RN  Outcome: Progressing  6/12/2024 2232 by Delmy Ruiz, RN  Outcome: Progressing     
PATIENT PRESENTS WITH POSITIVE, BRIGHTENED AFFECT.  VERY PLEASANT AND POLITE.  DISCUSSED DISCHARGE PLANS.  HIS FIRST GOAL IS TO GET A JOB.  HE STATES \"HOPEFULLY BACK HERE AT THE HOSPITAL WHERE I'LL HAVE PEOPLE TO CHECK UP ON ME, ASK ME HOW I'M DOING.\"        Problem: Behavior  Goal: Pt/Family maintain appropriate behavior and adhere to behavioral management agreement, if implemented  Description: INTERVENTIONS:  1. Assess patient/family's coping skills and  non-compliant behavior (including use of illegal substances)  2. Notify security of behavior or suspected illegal substances which indicate the need for search of the family and/or belongings  3. Encourage verbalization of thoughts and concerns in a socially appropriate manner  4. Utilize positive, consistent limit setting strategies supporting safety of patient, staff and others  5. Encourage participation in the decision making process about the behavioral management agreement  6. If a visitor's behavior poses a threat to safety call refer to organization policy.  7. Initiate consult with , Psychosocial CNS, Spiritual Care as appropriate  Outcome: Progressing     Problem: Coping  Goal: Pt/Family able to verbalize concerns and demonstrate effective coping strategies  Description: INTERVENTIONS:  1. Assist patient/family to identify coping skills, available support systems and cultural and spiritual values  2. Provide emotional support, including active listening and acknowledgement of concerns of patient and caregivers  3. Reduce environmental stimuli, as able  4. Instruct patient/family in relaxation techniques, as appropriate  5. Assess for spiritual pain/suffering and initiate Spiritual Care, Psychosocial Clinical Specialist consults as needed  6/13/2024 2042 by Citlaly Colindres, RN  Outcome: Progressing  6/13/2024 0847 by Faviola Hall, RN  Outcome: Progressing     Problem: Decision Making  Goal: Pt/Family able to effectively 
skills and  non-compliant behavior (including use of illegal substances)  2. Notify security of behavior or suspected illegal substances which indicate the need for search of the family and/or belongings  3. Encourage verbalization of thoughts and concerns in a socially appropriate manner  4. Utilize positive, consistent limit setting strategies supporting safety of patient, staff and others  5. Encourage participation in the decision making process about the behavioral management agreement  6. If a visitor's behavior poses a threat to safety call refer to organization policy.  7. Initiate consult with , Psychosocial CNS, Spiritual Care as appropriate  6/10/2024 0939 by Macy Mo, RN  Outcome: Progressing  6/9/2024 2028 by Delmy Ruiz, RN  Outcome: Progressing     Problem: Depression/Self Harm  Goal: Effect of psychiatric condition will be minimized and patient will be protected from self harm  Description: INTERVENTIONS:  1. Assess impact of patient's symptoms on level of functioning, self care needs and offer support as indicated  2. Assess patient/family knowledge of depression, impact on illness and need for teaching  3. Provide emotional support, presence and reassurance  4. Assess for possible suicidal thoughts or ideation. If patient expresses suicidal thoughts or statements do not leave alone, initiate Suicide Precautions, move to a room close to the nursing station and obtain sitter  5. Initiate consults as appropriate with Mental Health Professional, Spiritual Care, Psychosocial CNS, and consider a recommendation to the LIP for a Psychiatric Consultation  6/10/2024 0939 by Macy Mo, RN  Outcome: Progressing  6/9/2024 2028 by Delmy Ruiz, RN  Outcome: Progressing     
cooperative, and has a brightened affect.  -pt attends group, interacts with peers, and visible in the milieu.  -pt is med compliant and has been cooperative.  -appropriate behavior maintained; no management issues noted or reported.  -pt denies having any suicidal thoughts; no self injurious behavior noted or reported.      
injurious behavior noted or reported; pt denies having any suicidal thoughts.

## 2024-06-14 NOTE — DISCHARGE SUMMARY
issues. He also reported that his daughter just recently had her 11th birthday on May 21st and he has been unable to see her or spend time with her. He stated that child support just began last month and he just lost his job. He also stated that he is having some issues in his current relationship with trust issues and also feeling as  if he isn't being \"heard\". The following symptoms have been reported by the patient - suicidal thoughts, thoughts to cut himself, depressed mood, loss of interest in playing basketball, can't fall asleep, has no appetite unless he is smoking marijuana, some irritability, racing thoughts, crying spells, anxiety and substance use.      He has never been hospitalized before but has a history with mental health. He reported that he was a client with [facility] as a child. He also reported that his mother has a history of drug abuse and mental health issues. Endorses only medical issue is scoliosis of the spine. No family medical history could be verified by patient. Patient has NKA to drugs or food. Denies having any current medications or having being prescribed medications in the past. He does not have a psychiatrist or therapist in the community.     Endorses marijuana usage and alcohol usage of at least 5 days per week (tequila/liquor). The patient was also positive for cocaine and amphetamines, per urine drug screen. Further endorses the use of tobacco (cigarettes) daily - 1 pack. Denies any legal issues. Denies history of trauma or abuse of any kind. He is a high school graduate. Currently denies SI, HI, depression, anxiety, and audio-visual hallucinations. However, states that when he thinks of his situation he begins to feel depressed.      Patient spoken with today. He states that he is having relationship stress with his current, live-in girlfriend of 11 months. She is employed as a  for [employer]. States that recently she stayed out all night, went to a party, and

## 2024-06-14 NOTE — GROUP NOTE
Group Therapy Note    Date: 6/14/2024    Group Start Time: 0935  Group End Time: 1020  Group Topic: Education Group - Inpatient    SSR 2  NON ACUTE    Olamide Garrison        Group Therapy Note    Setting Goals/Facilitated discussion focused on “stepping up to a better you” by taking steps to improve in their well-being and identifying goals that would help to ensure follow-up       Attendees: 3/4       Patient's Goal:  Client will learn and demonstrate anxiety management skills    Notes: Receptive to information discussed and engaged. Pt shared prior to admission on a scale of 0/poor-10/good he was taking care of himself at a “5\" Pt shared \"his girlfriend, his daughter and himself\" influences how well he take care of himself. Pt shared his goal is to \"take medications as prescribed, avoid alcohol/drugs, get the proper amount of sleep, exercise regularly, participate in good grooming and eat a balanced \"    Status After Intervention:  Improved    Participation Level: Active Listener and Interactive    Participation Quality: Appropriate, Attentive, and Sharing      Speech:  normal      Thought Process/Content: Logical      Affective Functioning: Congruent      Mood:  Calm      Level of consciousness:  Attentive      Response to Learning: Able to verbalize current knowledge/experience      Endings: None Reported    Modes of Intervention: Education      Discipline Responsible: Recreational Therapist      Signature:  SULEMA Gregg

## 2024-12-05 ENCOUNTER — APPOINTMENT (OUTPATIENT)
Facility: HOSPITAL | Age: 29
End: 2024-12-05
Payer: MEDICAID

## 2024-12-05 ENCOUNTER — HOSPITAL ENCOUNTER (EMERGENCY)
Facility: HOSPITAL | Age: 29
Discharge: HOME | End: 2024-12-05
Attending: EMERGENCY MEDICINE
Payer: MEDICAID

## 2024-12-05 VITALS
HEIGHT: 74 IN | TEMPERATURE: 98.1 F | DIASTOLIC BLOOD PRESSURE: 90 MMHG | HEART RATE: 62 BPM | WEIGHT: 172 LBS | SYSTOLIC BLOOD PRESSURE: 150 MMHG | RESPIRATION RATE: 16 BRPM | OXYGEN SATURATION: 97 % | BODY MASS INDEX: 22.07 KG/M2

## 2024-12-05 DIAGNOSIS — S20.211A CONTUSION OF RIGHT CHEST WALL, INITIAL ENCOUNTER: ICD-10-CM

## 2024-12-05 DIAGNOSIS — S09.90XA CLOSED HEAD INJURY, INITIAL ENCOUNTER: Primary | ICD-10-CM

## 2024-12-05 DIAGNOSIS — S16.1XXA ACUTE STRAIN OF NECK MUSCLE, INITIAL ENCOUNTER: ICD-10-CM

## 2024-12-05 LAB
ABO + RH BLD: NORMAL
ALBUMIN SERPL-MCNC: 3.9 G/DL (ref 3.5–5)
ALBUMIN/GLOB SERPL: 1.1 (ref 1.1–2.2)
ALP SERPL-CCNC: 88 U/L (ref 45–117)
ALT SERPL-CCNC: 16 U/L (ref 12–78)
ANION GAP SERPL CALC-SCNC: 3 MMOL/L (ref 2–12)
AST SERPL W P-5'-P-CCNC: ABNORMAL U/L (ref 15–37)
BASOPHILS # BLD: 0 K/UL (ref 0–0.1)
BASOPHILS NFR BLD: 1 % (ref 0–1)
BILIRUB SERPL-MCNC: 0.4 MG/DL (ref 0.2–1)
BLOOD GROUP ANTIBODIES SERPL: NEGATIVE
BUN SERPL-MCNC: 8 MG/DL (ref 6–20)
BUN/CREAT SERPL: 8 (ref 12–20)
CA-I BLD-MCNC: 9.2 MG/DL (ref 8.5–10.1)
CHLORIDE SERPL-SCNC: 112 MMOL/L (ref 97–108)
CO2 SERPL-SCNC: 27 MMOL/L (ref 21–32)
CREAT SERPL-MCNC: 1 MG/DL (ref 0.7–1.3)
DIFFERENTIAL METHOD BLD: ABNORMAL
EOSINOPHIL # BLD: 0.1 K/UL (ref 0–0.4)
EOSINOPHIL NFR BLD: 1 % (ref 0–7)
ERYTHROCYTE [DISTWIDTH] IN BLOOD BY AUTOMATED COUNT: 14 % (ref 11.5–14.5)
GLOBULIN SER CALC-MCNC: 3.7 G/DL (ref 2–4)
GLUCOSE SERPL-MCNC: 86 MG/DL (ref 65–100)
HCT VFR BLD AUTO: 46.3 % (ref 36.6–50.3)
HGB BLD-MCNC: 15.4 G/DL (ref 12.1–17)
IMM GRANULOCYTES # BLD AUTO: 0 K/UL (ref 0–0.04)
IMM GRANULOCYTES NFR BLD AUTO: 0 % (ref 0–0.5)
INR PPP: 1.1 (ref 0.9–1.1)
LYMPHOCYTES # BLD: 2.6 K/UL (ref 0.8–3.5)
LYMPHOCYTES NFR BLD: 39 % (ref 12–49)
MCH RBC QN AUTO: 33.2 PG (ref 26–34)
MCHC RBC AUTO-ENTMCNC: 33.3 G/DL (ref 30–36.5)
MCV RBC AUTO: 99.8 FL (ref 80–99)
MONOCYTES # BLD: 0.6 K/UL (ref 0–1)
MONOCYTES NFR BLD: 9 % (ref 5–13)
NEUTS SEG # BLD: 3.3 K/UL (ref 1.8–8)
NEUTS SEG NFR BLD: 50 % (ref 32–75)
NRBC # BLD: 0 K/UL (ref 0–0.01)
NRBC BLD-RTO: 0 PER 100 WBC
PLATELET # BLD AUTO: 224 K/UL (ref 150–400)
PMV BLD AUTO: 9.5 FL (ref 8.9–12.9)
POTASSIUM SERPL-SCNC: ABNORMAL MMOL/L (ref 3.5–5.1)
PROT SERPL-MCNC: 7.6 G/DL (ref 6.4–8.2)
PROTHROMBIN TIME: 14.2 SEC (ref 11.9–14.6)
RBC # BLD AUTO: 4.64 M/UL (ref 4.1–5.7)
SODIUM SERPL-SCNC: 142 MMOL/L (ref 136–145)
SPECIMEN EXP DATE BLD: NORMAL
WBC # BLD AUTO: 6.6 K/UL (ref 4.1–11.1)

## 2024-12-05 PROCEDURE — 96376 TX/PRO/DX INJ SAME DRUG ADON: CPT

## 2024-12-05 PROCEDURE — 85025 COMPLETE CBC W/AUTO DIFF WBC: CPT

## 2024-12-05 PROCEDURE — 72125 CT NECK SPINE W/O DYE: CPT

## 2024-12-05 PROCEDURE — 80053 COMPREHEN METABOLIC PANEL: CPT

## 2024-12-05 PROCEDURE — 86850 RBC ANTIBODY SCREEN: CPT

## 2024-12-05 PROCEDURE — 71111 X-RAY EXAM RIBS/CHEST4/> VWS: CPT

## 2024-12-05 PROCEDURE — 99285 EMERGENCY DEPT VISIT HI MDM: CPT

## 2024-12-05 PROCEDURE — 70450 CT HEAD/BRAIN W/O DYE: CPT

## 2024-12-05 PROCEDURE — 6370000000 HC RX 637 (ALT 250 FOR IP): Performed by: EMERGENCY MEDICINE

## 2024-12-05 PROCEDURE — 6360000002 HC RX W HCPCS: Performed by: EMERGENCY MEDICINE

## 2024-12-05 PROCEDURE — 36415 COLL VENOUS BLD VENIPUNCTURE: CPT

## 2024-12-05 PROCEDURE — 86901 BLOOD TYPING SEROLOGIC RH(D): CPT

## 2024-12-05 PROCEDURE — 96374 THER/PROPH/DIAG INJ IV PUSH: CPT

## 2024-12-05 PROCEDURE — 73562 X-RAY EXAM OF KNEE 3: CPT

## 2024-12-05 PROCEDURE — 86900 BLOOD TYPING SEROLOGIC ABO: CPT

## 2024-12-05 PROCEDURE — 6360000002 HC RX W HCPCS

## 2024-12-05 PROCEDURE — 85610 PROTHROMBIN TIME: CPT

## 2024-12-05 PROCEDURE — 96375 TX/PRO/DX INJ NEW DRUG ADDON: CPT

## 2024-12-05 RX ORDER — MORPHINE SULFATE 4 MG/ML
4 INJECTION, SOLUTION INTRAMUSCULAR; INTRAVENOUS
Status: COMPLETED | OUTPATIENT
Start: 2024-12-05 | End: 2024-12-05

## 2024-12-05 RX ORDER — OXYCODONE AND ACETAMINOPHEN 5; 325 MG/1; MG/1
1 TABLET ORAL EVERY 6 HOURS PRN
Qty: 10 TABLET | Refills: 0 | Status: SHIPPED | OUTPATIENT
Start: 2024-12-05 | End: 2024-12-08

## 2024-12-05 RX ORDER — METHOCARBAMOL 750 MG/1
750 TABLET, FILM COATED ORAL ONCE
Status: COMPLETED | OUTPATIENT
Start: 2024-12-05 | End: 2024-12-05

## 2024-12-05 RX ORDER — ONDANSETRON 2 MG/ML
4 INJECTION INTRAMUSCULAR; INTRAVENOUS ONCE
Status: COMPLETED | OUTPATIENT
Start: 2024-12-05 | End: 2024-12-05

## 2024-12-05 RX ORDER — ONDANSETRON 2 MG/ML
INJECTION INTRAMUSCULAR; INTRAVENOUS
Status: COMPLETED
Start: 2024-12-05 | End: 2024-12-05

## 2024-12-05 RX ORDER — IBUPROFEN 600 MG/1
600 TABLET, FILM COATED ORAL 3 TIMES DAILY PRN
Qty: 30 TABLET | Refills: 0 | Status: SHIPPED | OUTPATIENT
Start: 2024-12-05

## 2024-12-05 RX ORDER — METHOCARBAMOL 750 MG/1
750 TABLET, FILM COATED ORAL 3 TIMES DAILY PRN
Qty: 30 TABLET | Refills: 0 | Status: SHIPPED | OUTPATIENT
Start: 2024-12-05 | End: 2024-12-15

## 2024-12-05 RX ADMIN — METHOCARBAMOL 750 MG: 750 TABLET ORAL at 15:15

## 2024-12-05 RX ADMIN — MORPHINE SULFATE 4 MG: 4 INJECTION, SOLUTION INTRAMUSCULAR; INTRAVENOUS at 15:15

## 2024-12-05 RX ADMIN — ONDANSETRON 4 MG: 2 INJECTION INTRAMUSCULAR; INTRAVENOUS at 13:30

## 2024-12-05 RX ADMIN — MORPHINE SULFATE 4 MG: 4 INJECTION INTRAVENOUS at 14:15

## 2024-12-05 ASSESSMENT — PAIN DESCRIPTION - LOCATION
LOCATION: HEAD

## 2024-12-05 ASSESSMENT — PAIN DESCRIPTION - DESCRIPTORS
DESCRIPTORS: ACHING

## 2024-12-05 ASSESSMENT — LIFESTYLE VARIABLES
HOW OFTEN DO YOU HAVE A DRINK CONTAINING ALCOHOL: 4 OR MORE TIMES A WEEK
HOW MANY STANDARD DRINKS CONTAINING ALCOHOL DO YOU HAVE ON A TYPICAL DAY: 5 OR 6

## 2024-12-05 ASSESSMENT — PAIN - FUNCTIONAL ASSESSMENT
PAIN_FUNCTIONAL_ASSESSMENT: NONE - DENIES PAIN
PAIN_FUNCTIONAL_ASSESSMENT: 0-10

## 2024-12-05 ASSESSMENT — PAIN SCALES - GENERAL
PAINLEVEL_OUTOF10: 8
PAINLEVEL_OUTOF10: 0
PAINLEVEL_OUTOF10: 8

## 2024-12-05 ASSESSMENT — PAIN DESCRIPTION - PAIN TYPE: TYPE: ACUTE PAIN
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
## 2024-12-05 NOTE — DISCHARGE INSTRUCTIONS
You can take the Percocet if you are having pain if the pain is worsening significantly you develop shortness of breath or abdominal pain or any other symptoms please return promptly at any time to be reevaluated.  You may be a little bit more sore tomorrow but if it is drastically worse then please return.        Thank you for choosing our Emergency Department for your care.  It is our privilege to care for you in your time of need.  In the next several days, you may receive a survey via email or mailed to your home about your experience with our team.  We would greatly appreciate you taking a few minutes to complete the survey, as we use this information to learn what we have done well and what we could be doing better. Thank you for trusting us with your care!    Below you will find a list of your tests from today's visit.   Labs  Recent Results (from the past 12 hour(s))   CBC with Auto Differential    Collection Time: 12/05/24  1:50 PM   Result Value Ref Range    WBC 6.6 4.1 - 11.1 K/uL    RBC 4.64 4.10 - 5.70 M/uL    Hemoglobin 15.4 12.1 - 17.0 g/dL    Hematocrit 46.3 36.6 - 50.3 %    MCV 99.8 (H) 80.0 - 99.0 FL    MCH 33.2 26.0 - 34.0 PG    MCHC 33.3 30.0 - 36.5 g/dL    RDW 14.0 11.5 - 14.5 %    Platelets 224 150 - 400 K/uL    MPV 9.5 8.9 - 12.9 FL    Nucleated RBCs 0.0 0.0  WBC    nRBC 0.00 0.00 - 0.01 K/uL    Neutrophils % 50 32 - 75 %    Lymphocytes % 39 12 - 49 %    Monocytes % 9 5 - 13 %    Eosinophils % 1 0 - 7 %    Basophils % 1 0 - 1 %    Immature Granulocytes % 0 0 - 0.5 %    Neutrophils Absolute 3.3 1.8 - 8.0 K/UL    Lymphocytes Absolute 2.6 0.8 - 3.5 K/UL    Monocytes Absolute 0.6 0.0 - 1.0 K/UL    Eosinophils Absolute 0.1 0.0 - 0.4 K/UL    Basophils Absolute 0.0 0.0 - 0.1 K/UL    Immature Granulocytes Absolute 0.0 0.00 - 0.04 K/UL    Differential Type AUTOMATED     Comprehensive Metabolic Panel    Collection Time: 12/05/24  1:50 PM   Result Value Ref Range    Sodium 142 136 - 145 mmol/L